# Patient Record
Sex: MALE | Race: BLACK OR AFRICAN AMERICAN | Employment: PART TIME | URBAN - METROPOLITAN AREA
[De-identification: names, ages, dates, MRNs, and addresses within clinical notes are randomized per-mention and may not be internally consistent; named-entity substitution may affect disease eponyms.]

---

## 2017-02-16 PROBLEM — R07.9 CHEST PAIN: Status: ACTIVE | Noted: 2017-02-16

## 2017-02-16 PROBLEM — R06.09 DOE (DYSPNEA ON EXERTION): Status: ACTIVE | Noted: 2017-02-16

## 2017-07-29 PROBLEM — R03.0 ELEVATED BLOOD PRESSURE READING: Status: ACTIVE | Noted: 2017-02-24

## 2017-11-02 PROBLEM — I10 ESSENTIAL HYPERTENSION: Status: ACTIVE | Noted: 2017-11-02

## 2017-11-30 PROBLEM — I10 ESSENTIAL HYPERTENSION: Status: RESOLVED | Noted: 2017-11-02 | Resolved: 2017-11-30

## 2017-11-30 PROBLEM — R06.09 DOE (DYSPNEA ON EXERTION): Status: RESOLVED | Noted: 2017-02-16 | Resolved: 2017-11-30

## 2017-11-30 PROBLEM — R03.0 ELEVATED BLOOD PRESSURE READING: Status: RESOLVED | Noted: 2017-02-24 | Resolved: 2017-11-30

## 2017-11-30 PROBLEM — R07.9 CHEST PAIN: Status: RESOLVED | Noted: 2017-02-16 | Resolved: 2017-11-30

## 2018-07-08 ENCOUNTER — APPOINTMENT (OUTPATIENT)
Dept: CT IMAGING | Age: 43
End: 2018-07-08
Payer: COMMERCIAL

## 2018-07-08 ENCOUNTER — HOSPITAL ENCOUNTER (EMERGENCY)
Age: 43
Discharge: HOME | End: 2018-07-08
Attending: EMERGENCY MEDICINE
Payer: COMMERCIAL

## 2018-07-08 ENCOUNTER — APPOINTMENT (OUTPATIENT)
Dept: GENERAL RADIOLOGY | Age: 43
End: 2018-07-08
Payer: COMMERCIAL

## 2018-07-08 VITALS
OXYGEN SATURATION: 99 % | HEIGHT: 68 IN | TEMPERATURE: 98 F | WEIGHT: 180 LBS | SYSTOLIC BLOOD PRESSURE: 142 MMHG | BODY MASS INDEX: 27.28 KG/M2 | DIASTOLIC BLOOD PRESSURE: 85 MMHG | RESPIRATION RATE: 16 BRPM | HEART RATE: 76 BPM

## 2018-07-08 DIAGNOSIS — R07.9 CHEST PAIN, UNSPECIFIED TYPE: Primary | ICD-10-CM

## 2018-07-08 DIAGNOSIS — K21.9 GASTRIC REFLUX: ICD-10-CM

## 2018-07-08 LAB
ALBUMIN SERPL-MCNC: 3.9 G/DL (ref 3.5–5.2)
ALP BLD-CCNC: 68 U/L (ref 40–129)
ALT SERPL-CCNC: 5 U/L (ref 0–40)
ANION GAP SERPL CALCULATED.3IONS-SCNC: 11 MMOL/L (ref 7–16)
AST SERPL-CCNC: 5 U/L (ref 0–39)
BASOPHILS ABSOLUTE: 0.04 E9/L (ref 0–0.2)
BASOPHILS RELATIVE PERCENT: 0.3 % (ref 0–2)
BILIRUB SERPL-MCNC: 0.3 MG/DL (ref 0–1.2)
BUN BLDV-MCNC: 17 MG/DL (ref 6–20)
CALCIUM SERPL-MCNC: 9.1 MG/DL (ref 8.6–10.2)
CHLORIDE BLD-SCNC: 100 MMOL/L (ref 98–107)
CO2: 28 MMOL/L (ref 22–29)
CREAT SERPL-MCNC: 1.2 MG/DL (ref 0.7–1.2)
EOSINOPHILS ABSOLUTE: 0.24 E9/L (ref 0.05–0.5)
EOSINOPHILS RELATIVE PERCENT: 1.7 % (ref 0–6)
GFR AFRICAN AMERICAN: >60
GFR NON-AFRICAN AMERICAN: >60 ML/MIN/1.73
GLUCOSE BLD-MCNC: 97 MG/DL (ref 74–109)
HCT VFR BLD CALC: 44.4 % (ref 37–54)
HEMOGLOBIN: 15.3 G/DL (ref 12.5–16.5)
IMMATURE GRANULOCYTES #: 0.03 E9/L
IMMATURE GRANULOCYTES %: 0.2 % (ref 0–5)
LIPASE: 256 U/L (ref 13–60)
LYMPHOCYTES ABSOLUTE: 4.96 E9/L (ref 1.5–4)
LYMPHOCYTES RELATIVE PERCENT: 35.9 % (ref 20–42)
MCH RBC QN AUTO: 31.1 PG (ref 26–35)
MCHC RBC AUTO-ENTMCNC: 34.5 % (ref 32–34.5)
MCV RBC AUTO: 90.2 FL (ref 80–99.9)
MONOCYTES ABSOLUTE: 0.84 E9/L (ref 0.1–0.95)
MONOCYTES RELATIVE PERCENT: 6.1 % (ref 2–12)
NEUTROPHILS ABSOLUTE: 7.7 E9/L (ref 1.8–7.3)
NEUTROPHILS RELATIVE PERCENT: 55.8 % (ref 43–80)
PDW BLD-RTO: 13.1 FL (ref 11.5–15)
PLATELET # BLD: 218 E9/L (ref 130–450)
PMV BLD AUTO: 12.3 FL (ref 7–12)
POTASSIUM SERPL-SCNC: 3.5 MMOL/L (ref 3.5–5)
RBC # BLD: 4.92 E12/L (ref 3.8–5.8)
SODIUM BLD-SCNC: 139 MMOL/L (ref 132–146)
TOTAL PROTEIN: 6.8 G/DL (ref 6.4–8.3)
TROPONIN: <0.01 NG/ML (ref 0–0.03)
WBC # BLD: 13.8 E9/L (ref 4.5–11.5)

## 2018-07-08 PROCEDURE — 84484 ASSAY OF TROPONIN QUANT: CPT

## 2018-07-08 PROCEDURE — 71045 X-RAY EXAM CHEST 1 VIEW: CPT

## 2018-07-08 PROCEDURE — 93005 ELECTROCARDIOGRAM TRACING: CPT | Performed by: EMERGENCY MEDICINE

## 2018-07-08 PROCEDURE — 74177 CT ABD & PELVIS W/CONTRAST: CPT

## 2018-07-08 PROCEDURE — 83690 ASSAY OF LIPASE: CPT

## 2018-07-08 PROCEDURE — 6360000004 HC RX CONTRAST MEDICATION: Performed by: RADIOLOGY

## 2018-07-08 PROCEDURE — 99285 EMERGENCY DEPT VISIT HI MDM: CPT

## 2018-07-08 PROCEDURE — 6370000000 HC RX 637 (ALT 250 FOR IP): Performed by: EMERGENCY MEDICINE

## 2018-07-08 PROCEDURE — 85025 COMPLETE CBC W/AUTO DIFF WBC: CPT

## 2018-07-08 PROCEDURE — 80053 COMPREHEN METABOLIC PANEL: CPT

## 2018-07-08 RX ORDER — FAMOTIDINE 20 MG/1
20 TABLET, FILM COATED ORAL ONCE
Status: COMPLETED | OUTPATIENT
Start: 2018-07-08 | End: 2018-07-08

## 2018-07-08 RX ORDER — FAMOTIDINE 20 MG/1
20 TABLET, FILM COATED ORAL 2 TIMES DAILY
Qty: 14 TABLET | Refills: 0 | Status: SHIPPED | OUTPATIENT
Start: 2018-07-08 | End: 2019-02-11

## 2018-07-08 RX ORDER — ASPIRIN 81 MG/1
324 TABLET, CHEWABLE ORAL ONCE
Status: COMPLETED | OUTPATIENT
Start: 2018-07-08 | End: 2018-07-08

## 2018-07-08 RX ORDER — NITROGLYCERIN 0.4 MG/1
0.4 TABLET SUBLINGUAL EVERY 5 MIN PRN
Status: DISCONTINUED | OUTPATIENT
Start: 2018-07-08 | End: 2018-07-08 | Stop reason: HOSPADM

## 2018-07-08 RX ADMIN — ASPIRIN 81 MG CHEWABLE TABLET 324 MG: 81 TABLET CHEWABLE at 08:15

## 2018-07-08 RX ADMIN — IOPAMIDOL 110 ML: 755 INJECTION, SOLUTION INTRAVENOUS at 09:34

## 2018-07-08 RX ADMIN — FAMOTIDINE 20 MG: 20 TABLET ORAL at 10:36

## 2018-07-08 RX ADMIN — NITROGLYCERIN 0.4 MG: 0.4 TABLET SUBLINGUAL at 08:15

## 2018-07-08 RX ADMIN — LIDOCAINE HYDROCHLORIDE: 20 SOLUTION ORAL; TOPICAL at 08:56

## 2018-07-08 ASSESSMENT — PAIN DESCRIPTION - DESCRIPTORS: DESCRIPTORS: ACHING

## 2018-07-08 ASSESSMENT — PAIN SCALES - GENERAL
PAINLEVEL_OUTOF10: 8
PAINLEVEL_OUTOF10: 6

## 2018-07-08 ASSESSMENT — PAIN DESCRIPTION - FREQUENCY: FREQUENCY: CONTINUOUS

## 2018-07-08 ASSESSMENT — PAIN DESCRIPTION - LOCATION
LOCATION: CHEST
LOCATION: ABDOMEN

## 2018-07-08 ASSESSMENT — PAIN SCALES - WONG BAKER: WONGBAKER_NUMERICALRESPONSE: 0

## 2018-07-08 ASSESSMENT — ENCOUNTER SYMPTOMS
NAUSEA: 1
VOMITING: 0
ABDOMINAL PAIN: 0
DIARRHEA: 0
COUGH: 0
SHORTNESS OF BREATH: 0
WHEEZING: 0

## 2018-07-08 ASSESSMENT — PAIN DESCRIPTION - PAIN TYPE
TYPE: ACUTE PAIN
TYPE: ACUTE PAIN

## 2018-07-08 ASSESSMENT — PAIN DESCRIPTION - ORIENTATION
ORIENTATION: UPPER
ORIENTATION: LEFT

## 2018-07-08 NOTE — ED PROVIDER NOTES
and well-nourished. No distress. HENT:   Head: Normocephalic and atraumatic. Right Ear: External ear normal.   Left Ear: External ear normal.   Eyes: Conjunctivae are normal.   Neck: Neck supple. Cardiovascular: Normal rate, regular rhythm, normal heart sounds and intact distal pulses. No murmur heard. Pulses:       Radial pulses are 2+ on the right side, and 2+ on the left side. Dorsalis pedis pulses are 2+ on the right side, and 2+ on the left side. Pulmonary/Chest: Effort normal and breath sounds normal. No respiratory distress. Abdominal: Soft. Bowel sounds are normal. He exhibits no distension. There is no tenderness. There is no rebound and no guarding. Musculoskeletal: He exhibits no edema (No lower extremity edema present). Neurological: He is alert and oriented to person, place, and time. Skin: Skin is warm and dry. He is not diaphoretic. Psychiatric: He has a normal mood and affect. Nursing note and vitals reviewed. Procedures    MDM  Number of Diagnoses or Management Options  Chest pain, unspecified type:   Gastric reflux:   Diagnosis management comments: Patient presents to the ED for chest pain. The patient was evaluated for acute coronary syndrome, pneumonia, pancreatitis, reflux. Patient was initially given aspirin and nitroglycerin with no relief of his symptoms. The RN stated the patient is complaining now more epigastric pain. Patient was given a GI cocktail with gradual improvement of the patient's symptoms. Patient was further evaluated the CT of abdomen/pelvis to further risk 8 elevated lipase. CT was unremarkable for acute pathology. Patient had a heart score of at most 2 as I believe this is not cardiac in origin. EKG showed benign early repolarization which is similar to his previous EKG. Stress test from last year also showed low risk for acute coronary event. Patient continues to be non-toxic on re-evaluation.  Findings were discussed with the patient and He reports that he does not drink alcohol or use drugs. Family History: family history includes Diabetes in his father; High Blood Pressure in his mother. The patients home medications have been reviewed. Allergies: Patient has no known allergies.     -------------------------------------------------- RESULTS -------------------------------------------------    Lab  Results for orders placed or performed during the hospital encounter of 07/08/18   CBC auto differential   Result Value Ref Range    WBC 13.8 (H) 4.5 - 11.5 E9/L    RBC 4.92 3.80 - 5.80 E12/L    Hemoglobin 15.3 12.5 - 16.5 g/dL    Hematocrit 44.4 37.0 - 54.0 %    MCV 90.2 80.0 - 99.9 fL    MCH 31.1 26.0 - 35.0 pg    MCHC 34.5 32.0 - 34.5 %    RDW 13.1 11.5 - 15.0 fL    Platelets 710 884 - 661 E9/L    MPV 12.3 (H) 7.0 - 12.0 fL    Neutrophils % 55.8 43.0 - 80.0 %    Immature Granulocytes % 0.2 0.0 - 5.0 %    Lymphocytes % 35.9 20.0 - 42.0 %    Monocytes % 6.1 2.0 - 12.0 %    Eosinophils % 1.7 0.0 - 6.0 %    Basophils % 0.3 0.0 - 2.0 %    Neutrophils # 7.70 (H) 1.80 - 7.30 E9/L    Immature Granulocytes # 0.03 E9/L    Lymphocytes # 4.96 (H) 1.50 - 4.00 E9/L    Monocytes # 0.84 0.10 - 0.95 E9/L    Eosinophils # 0.24 0.05 - 0.50 E9/L    Basophils # 0.04 0.00 - 0.20 E9/L   Comprehensive Metabolic Panel   Result Value Ref Range    Sodium 139 132 - 146 mmol/L    Potassium 3.5 3.5 - 5.0 mmol/L    Chloride 100 98 - 107 mmol/L    CO2 28 22 - 29 mmol/L    Anion Gap 11 7 - 16 mmol/L    Glucose 97 74 - 109 mg/dL    BUN 17 6 - 20 mg/dL    CREATININE 1.2 0.7 - 1.2 mg/dL    GFR Non-African American >60 >=60 mL/min/1.73    GFR African American >60     Calcium 9.1 8.6 - 10.2 mg/dL    Total Protein 6.8 6.4 - 8.3 g/dL    Alb 3.9 3.5 - 5.2 g/dL    Total Bilirubin 0.3 0.0 - 1.2 mg/dL    Alkaline Phosphatase 68 40 - 129 U/L    ALT 5 0 - 40 U/L    AST 5 0 - 39 U/L   Lipase   Result Value Ref Range    Lipase 256 (H) 13 - 60 U/L   Troponin   Result Value Ref Range Troponin <0.01 0.00 - 0.03 ng/mL       Radiology  CT ABDOMEN PELVIS W IV CONTRAST Additional Contrast? None   Final Result   Renal masses, statistically likely cysts. There are small parenchymal nonobstructive renal calculi                                      XR CHEST PORTABLE   Final Result   Tortuous ectatic aorta   Poor inspiratory effort, PA and lateral chest may be helpful when the   patient is able to cooperate                      EKG:  This EKG is signed and interpreted by me. Rate: 62  Rhythm: Sinus  Interpretation: Benign early repolarization  Comparison: stable as compared to patient's most recent EKG on 2/16/17    ------------------------- NURSING NOTES AND VITALS REVIEWED ---------------------------  Date / Time Roomed:  7/8/2018  7:13 AM  ED Bed Assignment:  23/23    The nursing notes within the ED encounter and vital signs as below have been reviewed. Patient Vitals for the past 24 hrs:   BP Temp Pulse Resp SpO2 Height Weight   07/08/18 1032 (!) 142/85 - 76 16 99 % - -   07/08/18 0828 133/81 - 64 16 99 % - -   07/08/18 0726 (!) 143/86 - 69 16 94 % - -   07/08/18 0717 (!) 148/90 98 °F (36.7 °C) 92 16 100 % 5' 8\" (1.727 m) 180 lb (81.6 kg)       Oxygen Saturation Interpretation: Normal    ------------------------------------------ PROGRESS NOTES ------------------------------------------  ED Course as of Jul 08 1111   Sun Jul 08, 2018   0919 Patient reassessed. Patient states pain has improved with GI cocktail. Patient states the nitroglycerin did not help one bit. Patient complains of epigastric pain now radiating to his left upper quadrant. With his elevated lipase I will order a CT abdomen further investigate. [MA]   1013 Patient reassessed. Patient does not appear to have acute surgical abdomen. There is no rigidity, guarding or rebound present. Patient states pain has almost resolved after GI cocktail. We discussed the results today.  He admits he \"hog out\" on food yesterday, especially before bedtime as this may exacerbate what we believe is reflux. [MA]      ED Course User Index  [MA] Kasia Gallego         10:15 AM  I have spoken with the patient and discussed todays results, in addition to providing specific details for the plan of care and counseling regarding the diagnosis and prognosis. Their questions are answered at this time and they are agreeable with the plan. I discussed at length with them reasons for immediate return here for re evaluation. They will followup with their primary care physician by calling their office on Monday.    --------------------------------- ADDITIONAL PROVIDER NOTES ---------------------------------  At this time the patient is without objective evidence of an acute process requiring hospitalization or inpatient management. They have remained hemodynamically stable throughout their entire ED visit and are stable for discharge with outpatient follow-up. The plan has been discussed in detail and they are aware of the specific conditions for emergent return, as well as the importance of follow-up. Discharge Medication List as of 7/8/2018 10:22 AM      START taking these medications    Details   famotidine (PEPCID) 20 MG tablet Take 1 tablet by mouth 2 times daily for 7 days, Disp-14 tablet, R-0Print             Diagnosis:  1. Chest pain, unspecified type    2. Gastric reflux        Disposition:  Patient's disposition: Discharge to home  Patient's condition is stable.                  Kasia Gallego  Resident  07/08/18 8458

## 2018-07-08 NOTE — ED NOTES
No relief from the nitro SL, complains of abd pain mid epigastric.       Hugo Mariscal RN  07/08/18 1078

## 2018-07-09 LAB
EKG ATRIAL RATE: 62 BPM
EKG P AXIS: 60 DEGREES
EKG P-R INTERVAL: 188 MS
EKG Q-T INTERVAL: 392 MS
EKG QRS DURATION: 98 MS
EKG QTC CALCULATION (BAZETT): 397 MS
EKG R AXIS: 39 DEGREES
EKG T AXIS: 14 DEGREES
EKG VENTRICULAR RATE: 62 BPM

## 2019-02-07 ENCOUNTER — HOSPITAL ENCOUNTER (EMERGENCY)
Age: 44
Discharge: HOME | End: 2019-02-07
Payer: COMMERCIAL

## 2019-02-07 ENCOUNTER — APPOINTMENT (OUTPATIENT)
Dept: GENERAL RADIOLOGY | Age: 44
End: 2019-02-07
Payer: COMMERCIAL

## 2019-02-07 VITALS
HEART RATE: 90 BPM | DIASTOLIC BLOOD PRESSURE: 97 MMHG | OXYGEN SATURATION: 98 % | HEIGHT: 69 IN | BODY MASS INDEX: 26.96 KG/M2 | SYSTOLIC BLOOD PRESSURE: 146 MMHG | RESPIRATION RATE: 14 BRPM | TEMPERATURE: 98.8 F | WEIGHT: 182 LBS

## 2019-02-07 DIAGNOSIS — M76.60 ACHILLES TENDINITIS, UNSPECIFIED LATERALITY: Primary | ICD-10-CM

## 2019-02-07 PROCEDURE — 99283 EMERGENCY DEPT VISIT LOW MDM: CPT

## 2019-02-07 PROCEDURE — 29515 APPLICATION SHORT LEG SPLINT: CPT

## 2019-02-07 PROCEDURE — 73610 X-RAY EXAM OF ANKLE: CPT

## 2019-02-07 PROCEDURE — 6370000000 HC RX 637 (ALT 250 FOR IP): Performed by: PHYSICIAN ASSISTANT

## 2019-02-07 RX ORDER — HYDROCODONE BITARTRATE AND ACETAMINOPHEN 5; 325 MG/1; MG/1
1 TABLET ORAL ONCE
Status: COMPLETED | OUTPATIENT
Start: 2019-02-07 | End: 2019-02-07

## 2019-02-07 RX ORDER — NAPROXEN 500 MG/1
500 TABLET ORAL 2 TIMES DAILY
Qty: 14 TABLET | Refills: 0 | Status: SHIPPED | OUTPATIENT
Start: 2019-02-07 | End: 2019-07-11

## 2019-02-07 RX ADMIN — HYDROCODONE BITARTRATE AND ACETAMINOPHEN 1 TABLET: 5; 325 TABLET ORAL at 21:49

## 2019-02-07 ASSESSMENT — PAIN SCALES - GENERAL
PAINLEVEL_OUTOF10: 8
PAINLEVEL_OUTOF10: 8

## 2019-02-08 ENCOUNTER — TELEPHONE (OUTPATIENT)
Dept: SURGERY | Age: 44
End: 2019-02-08

## 2019-02-11 ENCOUNTER — APPOINTMENT (OUTPATIENT)
Dept: ULTRASOUND IMAGING | Age: 44
End: 2019-02-11
Payer: COMMERCIAL

## 2019-02-11 ENCOUNTER — TELEPHONE (OUTPATIENT)
Dept: ADMINISTRATIVE | Age: 44
End: 2019-02-11

## 2019-02-11 ENCOUNTER — HOSPITAL ENCOUNTER (EMERGENCY)
Age: 44
Discharge: HOME | End: 2019-02-11
Payer: COMMERCIAL

## 2019-02-11 VITALS
OXYGEN SATURATION: 98 % | HEART RATE: 75 BPM | TEMPERATURE: 98.5 F | SYSTOLIC BLOOD PRESSURE: 135 MMHG | DIASTOLIC BLOOD PRESSURE: 82 MMHG | BODY MASS INDEX: 26.96 KG/M2 | RESPIRATION RATE: 16 BRPM | HEIGHT: 69 IN | WEIGHT: 182 LBS

## 2019-02-11 DIAGNOSIS — S86.012D STRAIN OF LEFT ACHILLES TENDON, SUBSEQUENT ENCOUNTER: Primary | ICD-10-CM

## 2019-02-11 PROCEDURE — 6370000000 HC RX 637 (ALT 250 FOR IP): Performed by: PHYSICIAN ASSISTANT

## 2019-02-11 PROCEDURE — 29515 APPLICATION SHORT LEG SPLINT: CPT

## 2019-02-11 PROCEDURE — 93971 EXTREMITY STUDY: CPT

## 2019-02-11 PROCEDURE — 99283 EMERGENCY DEPT VISIT LOW MDM: CPT

## 2019-02-11 RX ORDER — IBUPROFEN 400 MG/1
600 TABLET ORAL ONCE
Status: COMPLETED | OUTPATIENT
Start: 2019-02-11 | End: 2019-02-11

## 2019-02-11 RX ADMIN — IBUPROFEN 600 MG: 400 TABLET, FILM COATED ORAL at 08:55

## 2019-02-11 ASSESSMENT — PAIN SCALES - GENERAL: PAINLEVEL_OUTOF10: 6

## 2019-02-11 ASSESSMENT — PAIN DESCRIPTION - PAIN TYPE: TYPE: ACUTE PAIN

## 2019-02-11 ASSESSMENT — PAIN DESCRIPTION - PROGRESSION: CLINICAL_PROGRESSION: GRADUALLY WORSENING

## 2019-02-11 ASSESSMENT — PAIN DESCRIPTION - ORIENTATION: ORIENTATION: LEFT

## 2019-02-11 ASSESSMENT — PAIN DESCRIPTION - LOCATION: LOCATION: LEG;FOOT

## 2019-02-11 ASSESSMENT — PAIN DESCRIPTION - FREQUENCY: FREQUENCY: CONTINUOUS

## 2019-02-11 ASSESSMENT — PAIN DESCRIPTION - DESCRIPTORS: DESCRIPTORS: ACHING

## 2019-02-14 ENCOUNTER — OFFICE VISIT (OUTPATIENT)
Dept: ORTHOPEDIC SURGERY | Age: 44
End: 2019-02-14
Payer: COMMERCIAL

## 2019-02-14 VITALS
WEIGHT: 185 LBS | BODY MASS INDEX: 27.4 KG/M2 | DIASTOLIC BLOOD PRESSURE: 79 MMHG | HEIGHT: 69 IN | TEMPERATURE: 99 F | HEART RATE: 74 BPM | SYSTOLIC BLOOD PRESSURE: 131 MMHG

## 2019-02-14 DIAGNOSIS — S86.012A RUPTURE OF LEFT ACHILLES TENDON, INITIAL ENCOUNTER: Primary | ICD-10-CM

## 2019-02-14 PROCEDURE — 99213 OFFICE O/P EST LOW 20 MIN: CPT | Performed by: ORTHOPAEDIC SURGERY

## 2019-02-14 PROCEDURE — 99204 OFFICE O/P NEW MOD 45 MIN: CPT | Performed by: ORTHOPAEDIC SURGERY

## 2019-02-18 ENCOUNTER — PREP FOR PROCEDURE (OUTPATIENT)
Dept: ORTHOPEDIC SURGERY | Age: 44
End: 2019-02-18

## 2019-02-18 RX ORDER — SODIUM CHLORIDE, SODIUM LACTATE, POTASSIUM CHLORIDE, CALCIUM CHLORIDE 600; 310; 30; 20 MG/100ML; MG/100ML; MG/100ML; MG/100ML
INJECTION, SOLUTION INTRAVENOUS CONTINUOUS
Status: CANCELLED | OUTPATIENT
Start: 2019-02-18

## 2019-02-18 RX ORDER — CEFAZOLIN SODIUM 2 G/50ML
2 SOLUTION INTRAVENOUS
Status: CANCELLED | OUTPATIENT
Start: 2019-02-18 | End: 2019-02-18

## 2019-02-18 RX ORDER — SODIUM CHLORIDE 0.9 % (FLUSH) 0.9 %
10 SYRINGE (ML) INJECTION EVERY 12 HOURS SCHEDULED
Status: CANCELLED | OUTPATIENT
Start: 2019-02-18

## 2019-02-18 RX ORDER — SODIUM CHLORIDE 0.9 % (FLUSH) 0.9 %
10 SYRINGE (ML) INJECTION PRN
Status: CANCELLED | OUTPATIENT
Start: 2019-02-18

## 2019-02-18 NOTE — PROGRESS NOTES
[facility] 35 PRE-ADMISSION TESTING GENERAL INSTRUCTIONS- [facility]-phone number:[phone]    GENERAL INSTRUCTIONS  [x] Antibacterial Soap shower Night before and/or AM of Surgery  [] Moose wipe instruction sheet and wipes given. [x] Nothing by mouth after midnight, including gum, candy, mints, or water. [x] You may brush your teeth, gargle, but do NOT swallow water. []Hibiclens shower  the night before and the morning of surgery. Do not use             Hibiclens on your face or head. []No smoking, chewing tobacco, illegal drugs, or alcohol within 24 hours of your surgery. [x] Jewelry, valuables or body piercing's should not be brought to the hospital. All body and/or tongue piercing's must be removed prior to arriving to hospital.  ALL hair pins must be removed. [x] Do not wear makeup, lotions, powders, deodorant. Nail polish as directed by the nurse. [x] Arrange transportation with a responsible adult  to and from the hospital. If you do not have a responsible adult  to transport you, you will need to make arrangements with a medical transportation company (i.e. [employer]. A Uber/taxi/bus is not appropriate unless you are accompanied by a responsible adult ). Arrange for someone to be with you for the remainder of the day and for 24 hours after your procedure due to having had anesthesia. Who will be your  for transportation?____GIRLFRIEND ______________   Who will be staying with you for 24 hrs after your procedure?_______GIRLFRIEND___________  [x] Bring insurance card and photo ID.  [] Transfusion Bracelet: Please bring with you to hospital, day of surgery  [] Bring urine specimen day of surgery. Any small container is acceptable. [] Use inhalers the morning of surgery and bring with you to hospital.   []Bring copy of living will or healthcare power of  papers to be placed in your electronic record.   [] CPAP/BI-PAP: Please bring your

## 2019-02-20 ENCOUNTER — ANESTHESIA (OUTPATIENT)
Dept: OPERATING ROOM | Age: 44
End: 2019-02-20
Payer: COMMERCIAL

## 2019-02-20 ENCOUNTER — ANESTHESIA EVENT (OUTPATIENT)
Dept: OPERATING ROOM | Age: 44
End: 2019-02-20
Payer: COMMERCIAL

## 2019-02-20 ENCOUNTER — TELEPHONE (OUTPATIENT)
Dept: ORTHOPEDIC SURGERY | Age: 44
End: 2019-02-20

## 2019-02-20 ENCOUNTER — HOSPITAL ENCOUNTER (OUTPATIENT)
Age: 44
Setting detail: OUTPATIENT SURGERY
Discharge: HOME | End: 2019-02-20
Attending: ORTHOPAEDIC SURGERY | Admitting: ORTHOPAEDIC SURGERY
Payer: COMMERCIAL

## 2019-02-20 VITALS
DIASTOLIC BLOOD PRESSURE: 82 MMHG | BODY MASS INDEX: 27.4 KG/M2 | WEIGHT: 185 LBS | HEART RATE: 70 BPM | OXYGEN SATURATION: 99 % | HEIGHT: 69 IN | TEMPERATURE: 97.2 F | RESPIRATION RATE: 17 BRPM | SYSTOLIC BLOOD PRESSURE: 132 MMHG

## 2019-02-20 VITALS — TEMPERATURE: 96.1 F | OXYGEN SATURATION: 100 % | DIASTOLIC BLOOD PRESSURE: 75 MMHG | SYSTOLIC BLOOD PRESSURE: 122 MMHG

## 2019-02-20 DIAGNOSIS — S86.012A RUPTURE OF LEFT ACHILLES TENDON, INITIAL ENCOUNTER: ICD-10-CM

## 2019-02-20 DIAGNOSIS — I10 ESSENTIAL HYPERTENSION: ICD-10-CM

## 2019-02-20 DIAGNOSIS — Z01.818 PRE-OP TESTING: Primary | ICD-10-CM

## 2019-02-20 PROCEDURE — 7100000011 HC PHASE II RECOVERY - ADDTL 15 MIN: Performed by: ORTHOPAEDIC SURGERY

## 2019-02-20 PROCEDURE — 6370000000 HC RX 637 (ALT 250 FOR IP): Performed by: STUDENT IN AN ORGANIZED HEALTH CARE EDUCATION/TRAINING PROGRAM

## 2019-02-20 PROCEDURE — 6360000002 HC RX W HCPCS: Performed by: ANESTHESIOLOGY

## 2019-02-20 PROCEDURE — 7100000010 HC PHASE II RECOVERY - FIRST 15 MIN: Performed by: ORTHOPAEDIC SURGERY

## 2019-02-20 PROCEDURE — 2709999900 HC NON-CHARGEABLE SUPPLY: Performed by: ORTHOPAEDIC SURGERY

## 2019-02-20 PROCEDURE — 7100000001 HC PACU RECOVERY - ADDTL 15 MIN: Performed by: ORTHOPAEDIC SURGERY

## 2019-02-20 PROCEDURE — 27650 REPAIR ACHILLES TENDON: CPT | Performed by: ORTHOPAEDIC SURGERY

## 2019-02-20 PROCEDURE — 3700000001 HC ADD 15 MINUTES (ANESTHESIA): Performed by: ORTHOPAEDIC SURGERY

## 2019-02-20 PROCEDURE — 3700000000 HC ANESTHESIA ATTENDED CARE: Performed by: ORTHOPAEDIC SURGERY

## 2019-02-20 PROCEDURE — 2580000003 HC RX 258: Performed by: PHYSICIAN ASSISTANT

## 2019-02-20 PROCEDURE — 7100000000 HC PACU RECOVERY - FIRST 15 MIN: Performed by: ORTHOPAEDIC SURGERY

## 2019-02-20 PROCEDURE — 2500000003 HC RX 250 WO HCPCS

## 2019-02-20 PROCEDURE — 6360000002 HC RX W HCPCS: Performed by: PHYSICIAN ASSISTANT

## 2019-02-20 PROCEDURE — 3600000003 HC SURGERY LEVEL 3 BASE: Performed by: ORTHOPAEDIC SURGERY

## 2019-02-20 PROCEDURE — 6360000002 HC RX W HCPCS

## 2019-02-20 PROCEDURE — 3600000013 HC SURGERY LEVEL 3 ADDTL 15MIN: Performed by: ORTHOPAEDIC SURGERY

## 2019-02-20 PROCEDURE — 64447 NJX AA&/STRD FEMORAL NRV IMG: CPT | Performed by: ANESTHESIOLOGY

## 2019-02-20 PROCEDURE — 6370000000 HC RX 637 (ALT 250 FOR IP): Performed by: ORTHOPAEDIC SURGERY

## 2019-02-20 RX ORDER — MORPHINE SULFATE 2 MG/ML
1 INJECTION, SOLUTION INTRAMUSCULAR; INTRAVENOUS EVERY 5 MIN PRN
Status: DISCONTINUED | OUTPATIENT
Start: 2019-02-20 | End: 2019-02-20 | Stop reason: HOSPADM

## 2019-02-20 RX ORDER — ROCURONIUM BROMIDE 10 MG/ML
INJECTION, SOLUTION INTRAVENOUS PRN
Status: DISCONTINUED | OUTPATIENT
Start: 2019-02-20 | End: 2019-02-20 | Stop reason: SDUPTHER

## 2019-02-20 RX ORDER — HYDROCODONE BITARTRATE AND ACETAMINOPHEN 5; 325 MG/1; MG/1
1 TABLET ORAL PRN
Status: DISCONTINUED | OUTPATIENT
Start: 2019-02-20 | End: 2019-02-20 | Stop reason: HOSPADM

## 2019-02-20 RX ORDER — HYDROCODONE BITARTRATE AND ACETAMINOPHEN 5; 325 MG/1; MG/1
2 TABLET ORAL PRN
Status: DISCONTINUED | OUTPATIENT
Start: 2019-02-20 | End: 2019-02-20 | Stop reason: HOSPADM

## 2019-02-20 RX ORDER — OXYCODONE HYDROCHLORIDE AND ACETAMINOPHEN 5; 325 MG/1; MG/1
1 TABLET ORAL ONCE
Status: COMPLETED | OUTPATIENT
Start: 2019-02-20 | End: 2019-02-20

## 2019-02-20 RX ORDER — OXYCODONE HYDROCHLORIDE AND ACETAMINOPHEN 5; 325 MG/1; MG/1
1 TABLET ORAL EVERY 6 HOURS PRN
Qty: 28 TABLET | Refills: 0 | Status: SHIPPED | OUTPATIENT
Start: 2019-02-20 | End: 2019-02-27

## 2019-02-20 RX ORDER — DIAPER,BRIEF,INFANT-TODD,DISP
EACH MISCELLANEOUS PRN
Status: DISCONTINUED | OUTPATIENT
Start: 2019-02-20 | End: 2019-02-20 | Stop reason: ALTCHOICE

## 2019-02-20 RX ORDER — CEFAZOLIN SODIUM 2 G/50ML
2 SOLUTION INTRAVENOUS
Status: COMPLETED | OUTPATIENT
Start: 2019-02-20 | End: 2019-02-20

## 2019-02-20 RX ORDER — MIDAZOLAM HYDROCHLORIDE 1 MG/ML
2 INJECTION INTRAMUSCULAR; INTRAVENOUS ONCE
Status: COMPLETED | OUTPATIENT
Start: 2019-02-20 | End: 2019-02-20

## 2019-02-20 RX ORDER — ROPIVACAINE HYDROCHLORIDE 5 MG/ML
30 INJECTION, SOLUTION EPIDURAL; INFILTRATION; PERINEURAL ONCE
Status: COMPLETED | OUTPATIENT
Start: 2019-02-20 | End: 2019-02-20

## 2019-02-20 RX ORDER — FENTANYL CITRATE 50 UG/ML
100 INJECTION, SOLUTION INTRAMUSCULAR; INTRAVENOUS ONCE
Status: COMPLETED | OUTPATIENT
Start: 2019-02-20 | End: 2019-02-20

## 2019-02-20 RX ORDER — MORPHINE SULFATE 2 MG/ML
2 INJECTION, SOLUTION INTRAMUSCULAR; INTRAVENOUS EVERY 5 MIN PRN
Status: DISCONTINUED | OUTPATIENT
Start: 2019-02-20 | End: 2019-02-20 | Stop reason: HOSPADM

## 2019-02-20 RX ORDER — ASPIRIN 325 MG
325 TABLET, DELAYED RELEASE (ENTERIC COATED) ORAL 2 TIMES DAILY
Qty: 56 TABLET | Refills: 0 | Status: SHIPPED | OUTPATIENT
Start: 2019-02-20 | End: 2019-03-08 | Stop reason: SDUPTHER

## 2019-02-20 RX ORDER — GLYCOPYRROLATE 1 MG/5 ML
SYRINGE (ML) INTRAVENOUS PRN
Status: DISCONTINUED | OUTPATIENT
Start: 2019-02-20 | End: 2019-02-20 | Stop reason: SDUPTHER

## 2019-02-20 RX ORDER — ONDANSETRON 2 MG/ML
INJECTION INTRAMUSCULAR; INTRAVENOUS PRN
Status: DISCONTINUED | OUTPATIENT
Start: 2019-02-20 | End: 2019-02-20 | Stop reason: SDUPTHER

## 2019-02-20 RX ORDER — SODIUM CHLORIDE, SODIUM LACTATE, POTASSIUM CHLORIDE, CALCIUM CHLORIDE 600; 310; 30; 20 MG/100ML; MG/100ML; MG/100ML; MG/100ML
INJECTION, SOLUTION INTRAVENOUS CONTINUOUS
Status: DISCONTINUED | OUTPATIENT
Start: 2019-02-20 | End: 2019-02-20 | Stop reason: HOSPADM

## 2019-02-20 RX ORDER — SODIUM CHLORIDE 0.9 % (FLUSH) 0.9 %
10 SYRINGE (ML) INJECTION EVERY 12 HOURS SCHEDULED
Status: DISCONTINUED | OUTPATIENT
Start: 2019-02-20 | End: 2019-02-20 | Stop reason: HOSPADM

## 2019-02-20 RX ORDER — PROMETHAZINE HYDROCHLORIDE 25 MG/ML
6.25 INJECTION, SOLUTION INTRAMUSCULAR; INTRAVENOUS EVERY 10 MIN PRN
Status: DISCONTINUED | OUTPATIENT
Start: 2019-02-20 | End: 2019-02-20 | Stop reason: HOSPADM

## 2019-02-20 RX ORDER — NEOSTIGMINE METHYLSULFATE 0.5 MG/ML
INJECTION, SOLUTION INTRAVENOUS PRN
Status: DISCONTINUED | OUTPATIENT
Start: 2019-02-20 | End: 2019-02-20 | Stop reason: SDUPTHER

## 2019-02-20 RX ORDER — DEXAMETHASONE SODIUM PHOSPHATE 10 MG/ML
INJECTION INTRAMUSCULAR; INTRAVENOUS PRN
Status: DISCONTINUED | OUTPATIENT
Start: 2019-02-20 | End: 2019-02-20 | Stop reason: SDUPTHER

## 2019-02-20 RX ORDER — PROPOFOL 10 MG/ML
INJECTION, EMULSION INTRAVENOUS PRN
Status: DISCONTINUED | OUTPATIENT
Start: 2019-02-20 | End: 2019-02-20 | Stop reason: SDUPTHER

## 2019-02-20 RX ORDER — SODIUM CHLORIDE 0.9 % (FLUSH) 0.9 %
10 SYRINGE (ML) INJECTION PRN
Status: DISCONTINUED | OUTPATIENT
Start: 2019-02-20 | End: 2019-02-20 | Stop reason: HOSPADM

## 2019-02-20 RX ORDER — MEPERIDINE HYDROCHLORIDE 50 MG/ML
12.5 INJECTION INTRAMUSCULAR; INTRAVENOUS; SUBCUTANEOUS EVERY 5 MIN PRN
Status: DISCONTINUED | OUTPATIENT
Start: 2019-02-20 | End: 2019-02-20 | Stop reason: HOSPADM

## 2019-02-20 RX ADMIN — SODIUM CHLORIDE, POTASSIUM CHLORIDE, SODIUM LACTATE AND CALCIUM CHLORIDE: 600; 310; 30; 20 INJECTION, SOLUTION INTRAVENOUS at 11:21

## 2019-02-20 RX ADMIN — FENTANYL CITRATE 100 MCG: 50 INJECTION, SOLUTION INTRAMUSCULAR; INTRAVENOUS at 13:00

## 2019-02-20 RX ADMIN — PROPOFOL 200 MG: 10 INJECTION, EMULSION INTRAVENOUS at 13:46

## 2019-02-20 RX ADMIN — MIDAZOLAM HYDROCHLORIDE 2 MG: 2 INJECTION, SOLUTION INTRAMUSCULAR; INTRAVENOUS at 13:00

## 2019-02-20 RX ADMIN — ONDANSETRON 4 MG: 2 INJECTION INTRAMUSCULAR; INTRAVENOUS at 14:53

## 2019-02-20 RX ADMIN — ROCURONIUM BROMIDE 40 MG: 10 INJECTION, SOLUTION INTRAVENOUS at 13:46

## 2019-02-20 RX ADMIN — SODIUM CHLORIDE, POTASSIUM CHLORIDE, SODIUM LACTATE AND CALCIUM CHLORIDE: 600; 310; 30; 20 INJECTION, SOLUTION INTRAVENOUS at 14:40

## 2019-02-20 RX ADMIN — MORPHINE SULFATE 2 MG: 2 INJECTION, SOLUTION INTRAMUSCULAR; INTRAVENOUS at 15:40

## 2019-02-20 RX ADMIN — CEFAZOLIN SODIUM 2 G: 2 SOLUTION INTRAVENOUS at 13:50

## 2019-02-20 RX ADMIN — OXYCODONE AND ACETAMINOPHEN 1 TABLET: 5; 325 TABLET ORAL at 16:23

## 2019-02-20 RX ADMIN — NEOSTIGMINE METHYLSULFATE 3 MG: 0.5 INJECTION INTRAVENOUS at 15:10

## 2019-02-20 RX ADMIN — ROPIVACAINE HYDROCHLORIDE 30 ML: 5 INJECTION EPIDURAL; INFILTRATION; PERINEURAL at 13:00

## 2019-02-20 RX ADMIN — MORPHINE SULFATE 2 MG: 2 INJECTION, SOLUTION INTRAMUSCULAR; INTRAVENOUS at 16:00

## 2019-02-20 RX ADMIN — LIDOCAINE HYDROCHLORIDE 100 MG: 20 INJECTION, SOLUTION INTRAVENOUS at 13:46

## 2019-02-20 RX ADMIN — Medication 0.6 MG: at 15:10

## 2019-02-20 RX ADMIN — DEXAMETHASONE SODIUM PHOSPHATE 10 MG: 10 INJECTION INTRAMUSCULAR; INTRAVENOUS at 13:46

## 2019-02-20 ASSESSMENT — PULMONARY FUNCTION TESTS
PIF_VALUE: 30
PIF_VALUE: 26
PIF_VALUE: 17
PIF_VALUE: 18
PIF_VALUE: 29
PIF_VALUE: 21
PIF_VALUE: 8
PIF_VALUE: 1
PIF_VALUE: 1
PIF_VALUE: 29
PIF_VALUE: 27
PIF_VALUE: 20
PIF_VALUE: 27
PIF_VALUE: 29
PIF_VALUE: 29
PIF_VALUE: 18
PIF_VALUE: 28
PIF_VALUE: 27
PIF_VALUE: 22
PIF_VALUE: 4
PIF_VALUE: 27
PIF_VALUE: 27
PIF_VALUE: 21
PIF_VALUE: 28
PIF_VALUE: 28
PIF_VALUE: 21
PIF_VALUE: 21
PIF_VALUE: 29
PIF_VALUE: 1
PIF_VALUE: 27
PIF_VALUE: 27
PIF_VALUE: 31
PIF_VALUE: 27
PIF_VALUE: 27
PIF_VALUE: 28
PIF_VALUE: 29
PIF_VALUE: 3
PIF_VALUE: 28
PIF_VALUE: 28
PIF_VALUE: 30
PIF_VALUE: 27
PIF_VALUE: 28
PIF_VALUE: 27
PIF_VALUE: 0
PIF_VALUE: 21
PIF_VALUE: 27
PIF_VALUE: 26
PIF_VALUE: 2
PIF_VALUE: 27
PIF_VALUE: 26
PIF_VALUE: 4
PIF_VALUE: 27
PIF_VALUE: 29
PIF_VALUE: 27
PIF_VALUE: 28
PIF_VALUE: 28
PIF_VALUE: 29
PIF_VALUE: 28
PIF_VALUE: 21
PIF_VALUE: 30
PIF_VALUE: 8
PIF_VALUE: 29
PIF_VALUE: 20
PIF_VALUE: 29
PIF_VALUE: 31
PIF_VALUE: 1
PIF_VALUE: 2
PIF_VALUE: 27
PIF_VALUE: 27
PIF_VALUE: 28
PIF_VALUE: 28
PIF_VALUE: 27
PIF_VALUE: 27
PIF_VALUE: 7
PIF_VALUE: 19
PIF_VALUE: 30
PIF_VALUE: 28
PIF_VALUE: 28
PIF_VALUE: 27
PIF_VALUE: 0
PIF_VALUE: 25
PIF_VALUE: 30
PIF_VALUE: 16
PIF_VALUE: 27
PIF_VALUE: 29
PIF_VALUE: 29
PIF_VALUE: 0
PIF_VALUE: 28
PIF_VALUE: 19
PIF_VALUE: 27
PIF_VALUE: 30
PIF_VALUE: 27
PIF_VALUE: 27

## 2019-02-20 ASSESSMENT — PAIN DESCRIPTION - LOCATION
LOCATION: LEG

## 2019-02-20 ASSESSMENT — PAIN SCALES - GENERAL
PAINLEVEL_OUTOF10: 10
PAINLEVEL_OUTOF10: 4
PAINLEVEL_OUTOF10: 8
PAINLEVEL_OUTOF10: 10
PAINLEVEL_OUTOF10: 0

## 2019-02-20 ASSESSMENT — PAIN DESCRIPTION - PAIN TYPE
TYPE: SURGICAL PAIN

## 2019-02-20 ASSESSMENT — PAIN DESCRIPTION - DESCRIPTORS
DESCRIPTORS: ACHING;BURNING;CONSTANT
DESCRIPTORS: ACHING
DESCRIPTORS: SORE;ACHING
DESCRIPTORS: ACHING

## 2019-02-20 ASSESSMENT — PAIN DESCRIPTION - ORIENTATION
ORIENTATION: LEFT;LOWER

## 2019-02-20 ASSESSMENT — PAIN DESCRIPTION - FREQUENCY
FREQUENCY: CONTINUOUS

## 2019-02-20 ASSESSMENT — PAIN DESCRIPTION - PROGRESSION
CLINICAL_PROGRESSION: GRADUALLY IMPROVING
CLINICAL_PROGRESSION: GRADUALLY WORSENING
CLINICAL_PROGRESSION: GRADUALLY WORSENING

## 2019-02-20 ASSESSMENT — PAIN - FUNCTIONAL ASSESSMENT: PAIN_FUNCTIONAL_ASSESSMENT: 0-10

## 2019-02-20 NOTE — BRIEF OP NOTE
Brief Postoperative Note  ______________________________________________________________    Patient: Kourtney Cheatham  YOB: 1975  MRN: [id number]  Date of Procedure: 2/20/2019    Pre-Op Diagnosis: LEFT ACHILLES TENDON RUPTURE    Post-Op Diagnosis: Same       Procedure(s):  LEFT ACHILLES TENDON REPAIR    Anesthesia: Regional, General    Surgeon(s):  Mortimer Ginsberg, DO    Assistant: Vanesa Bowman DO    Estimated Blood Loss (mL): less than 50     Complications: None    Specimens:   * No specimens in log *    Implants:  * No implants in log *        Jose Eduardo Lancaster DO  Date: 2/20/2019  Time: 2:45 PM     Electronically Signed By  Dennys IRAHETA

## 2019-02-20 NOTE — H&P
Please refer to my H&P below. No interval changes. Electronically Signed By  Jeff Gustafson D.O.  2/20/2019  11:53 AM        Orthopaedic Surgery H&P Note     Walter Gabriel is a 39 y.o. male, his YOB: 1975 with the [facility] [place]             Patient Active Problem List     Diagnosis Date Noted    Essential hypertension 11/02/2017      Current Facility-Administered Medications          Current Outpatient Prescriptions   Medication Sig Dispense Refill    naproxen (NAPROSYN) 500 MG tablet Take 1 tablet by mouth 2 times daily for 7 days 14 tablet 0      No current facility-administered medications for this visit.          Allergies: Patient has no known allergies. Past Medical History        Past Medical History:   Diagnosis Date    Closed fracture of left distal radius 7/2/2014    Essential hypertension 11/2/2017    Fracture of radial neck, right, closed 7/2/2014         Past Surgical History   No past surgical history on file. Family History         Family History   Problem Relation Age of Onset    High Blood Pressure Mother      Diabetes Father                Social History   Substance Use Topics    Smoking status: Never Smoker    Smokeless tobacco: Never Used    Alcohol use No                                    Chief Complaint   Patient presents with    ED Follow-up       seen 02/07/19 playing basketball and became unable to weight bear on lt foot / seen again on 02/11/19 for severe pain.  Tendonitis         SUBJECTIVE:   This is a 59-year-old male presents the office for follow-up following an emergency department visit on 2/7/2019. Patient states he was playing basketball felt a severe pain in his left heel, following the injury he was unable to weight-bear. He reported the emergency department on 2/7/19 as well as 2/11/19 regarding the same pain.  Continues to complain of pain today about the base of the heel in the region of the achilles station, CN II-VII intact  HEENT: normochephalic atraumatic, external ears and eyes normal, sclera normal, neck supple, no nasal discharge. Extremities:   peripheral pulses normal, no edema, redness or tenderness in the calves   Skin: normal coloration, no rashes or open wounds, no suspicious skin lesions noted  Psych: Affect euthymic   Musculoskeletal:   Left Lower Extremity Exam:  Skin intact , no signs of infection, or breakdown. Swelling and ecchymosis over Achilles distally, palpable defect, + [place]  Tender to palpation over tendon watershed area   Demonstrates active ankle dorsiflexion/great toe extension. Weak active planter flexion noted. Sensation intact to light touch in sural/deep peroneal/superficial peroneal/saphenous/posterior tibial nerve distributions to foot/ankle. Palpable dorsalis pedis and posterior tibialis pulses, cap refill brisk in toes, foot warm/perfused.     /79 (Site: Left Upper Arm, Position: Sitting, Cuff Size: Medium Adult)   Pulse 74   Temp 99 °F (37.2 °C)   Ht 5' 9\" (1.753 m)   Wt 185 lb (83.9 kg)   BMI 27.32 kg/m²      XR: 2/18/19   Xrays of the left ankle reviewed demonstrating no acute fracture, pathology, or bony deformity. Ultrasound left leg also obtained demonstrating no DVT. ASSESSMENT:       Diagnosis Orders   1. Rupture of left Achilles tendon, initial encounter            PLAN:  Had lengthy discussion with patient regarding their diagnosis, typical prognosis, and expected outcomes. We reviewed the possible complications from the injury itself despite treatment chosen. We also discussed treatment options including nonoperative managements versus surgical management, along with risks and benefits of each.    Patient has elected for surgical management despite associated risks.      Plan for OR on 2/20/19 for left achilles tendon repair  Placed in splint, keep clean and dry  Remain NWB     I have explained the risks and complications of the recommended surgery with the patient at length, as well as discussed potential treatment alternatives including nonoperative management.  These risks include but are not limited to death or complication from anesthesia, continued pain, nerve tendon or vascular injury, infection, rerupture, stiffness, weakness, wound healing complications, symptomatic hardware or hardware failure, deep vein thrombosis or pulmonary embolism, and need for further surgery, etc.  Patient understood this, asked appropriate questions, which were all answered, and he has elected to proceed with the procedure.     Electronically Signed By  Colette Cheadle D.O.  2/14/2019  2:33 PM

## 2019-02-21 NOTE — OP NOTE
[address]                  [address] [facility], [address] [place] [address]                                OPERATIVE REPORT    PATIENT NAME: Roscoe Durant                 :        1975  MED REC NO:   [id number]                            ROOM:  ACCOUNT NO:   [de-identified]                           ADMIT DATE: 2019  PROVIDER:     Medardo Jeong DO    DATE OF PROCEDURE:  2019    OPERATING SURGEON:  Medardo Jeong D.O.    ASSISTANT:  Bonna Scheuermann, D.O.    PREOPERATIVE DIAGNOSIS:  Left Achilles tendon rupture. POSTOPERATIVE DIAGNOSIS:  Left Achilles tendon rupture. TITLE OF PROCEDURE:  Left Achilles tendon rupture direct repair. ANESTHESIA:  General with preoperative regional block. ESTIMATED BLOOD LOSS:  Minimal.    COMPLICATIONS:  None. INDICATIONS:  The patient is a healthy active 41-year-old male who  sustained abovementioned injury during basketball play. He was seen in  the office and diagnosis was made. Treatment options were discussed and  outlined. The patient elected for operative intervention despite  associated surgical risk. Risk and benefits of surgery outlined in  detail. He verbalized understanding of all that was discussed. All his  questions were addressed to satisfaction. He did elect to proceed with  the procedure as outlined. DESCRIPTION OF THE PROCEDURE:  The patient was brought to the operating  suite where he received a general anesthetic by the Department of  Anesthesia as well as 2 gm of Ancef intravenously. He was now carefully  positioned in the prone position on the operating table. All bony  prominences were carefully padded. The left lower extremity was  sterilely prepped and draped out in the usual fashion using Chloraprep  scrub. Surgical time-out was performed per protocol by all members of  the surgical team.  The leg was elevated and exsanguinated with an  Esmarch.   Tourniquet was set at 275 mmHg pressure. A longitudinal  incision was made of the medial border of the Achilles tendon. Skin was  incised with a scalpel. Electrocautery was taken through the  subcutaneous tissues. Full-thickness flaps were created. Peritenon was  identified and this was now incised longitudinally in line with the  incision. This was now carefully dissected off the Achilles tendon. The patient had traumatic rupture of the Achilles tendon at a more  proximal location in a typical watershed area, more in the myotendinous  juncture. The tendinous stumps were exposed through the wound. The  lacerated ends were sharply excised back to healthy tissue. We were  limited on the proximal extent due to the location of the rupture. The  tendinous edges were now tagged using a #5 FiberWire in a running  locked-[place] fashion. Both tails were now passed into the  contralateral stump regions and using the gift box technique tied with  the two tendinous ends well approximated. This was further imbricated  with Vicryl suture neatening the repair. The peritenon was now  reapproximated over the repair site using 5-0 Prolene. Wound was  irrigated. Subcutaneous tissue was now closed with Monocryl and skin  with nylon in simple interrupted fashion. Wounds were dressed with  antibiotic ointment, Xeroform, 4x4, fluffs, Webril, and a well-padded  posterior splint with the ankle in a plantarflexed position. Tourniquet  was let down at this juncture. The patient was now positioned back into  supine position on the [facility], was extubated uneventfully, and  taken to the postanesthesia care unit in stable condition. POSTOPERATIVE PLAN:  This patient will be discharged home today. This  was scheduled as outpatient procedure. He received a prescription for  aspirin and for pain control. He is to be nonweightbearing of the  affected extremity, maintain his splint, and elevation.   He will follow  up in the office in two weeks for repeat evaluation. Anticipate  transition to a short leg cast at that point.         Vandana Price DO    D: 02/20/2019 15:38:00       T: 02/21/2019 1:17:50     [email or web address]  Job#: [id number]     Doc#: [id number]    CC:

## 2019-03-06 DIAGNOSIS — S86.012D RUPTURE OF LEFT ACHILLES TENDON, SUBSEQUENT ENCOUNTER: Primary | ICD-10-CM

## 2019-03-08 ENCOUNTER — OFFICE VISIT (OUTPATIENT)
Dept: ORTHOPEDIC SURGERY | Age: 44
End: 2019-03-08
Payer: COMMERCIAL

## 2019-03-08 ENCOUNTER — HOSPITAL ENCOUNTER (OUTPATIENT)
Dept: GENERAL RADIOLOGY | Age: 44
Discharge: HOME | End: 2019-03-10
Payer: COMMERCIAL

## 2019-03-08 VITALS
DIASTOLIC BLOOD PRESSURE: 82 MMHG | TEMPERATURE: 99 F | RESPIRATION RATE: 18 BRPM | HEART RATE: 58 BPM | SYSTOLIC BLOOD PRESSURE: 140 MMHG

## 2019-03-08 DIAGNOSIS — S86.012D RUPTURE OF LEFT ACHILLES TENDON, SUBSEQUENT ENCOUNTER: Primary | ICD-10-CM

## 2019-03-08 DIAGNOSIS — S86.012D RUPTURE OF LEFT ACHILLES TENDON, SUBSEQUENT ENCOUNTER: ICD-10-CM

## 2019-03-08 PROCEDURE — 99024 POSTOP FOLLOW-UP VISIT: CPT | Performed by: ORTHOPAEDIC SURGERY

## 2019-03-08 PROCEDURE — 99213 OFFICE O/P EST LOW 20 MIN: CPT

## 2019-03-08 PROCEDURE — 73610 X-RAY EXAM OF ANKLE: CPT

## 2019-03-08 RX ORDER — OXYCODONE HYDROCHLORIDE AND ACETAMINOPHEN 5; 325 MG/1; MG/1
1 TABLET ORAL EVERY 6 HOURS PRN
COMMUNITY
End: 2019-05-16 | Stop reason: ALTCHOICE

## 2019-04-05 ENCOUNTER — OFFICE VISIT (OUTPATIENT)
Dept: ORTHOPEDIC SURGERY | Age: 44
End: 2019-04-05
Payer: COMMERCIAL

## 2019-04-05 ENCOUNTER — HOSPITAL ENCOUNTER (OUTPATIENT)
Dept: GENERAL RADIOLOGY | Age: 44
Discharge: HOME | End: 2019-04-07
Payer: COMMERCIAL

## 2019-04-05 VITALS — HEIGHT: 69 IN | WEIGHT: 194 LBS | BODY MASS INDEX: 28.73 KG/M2 | OXYGEN SATURATION: 98 % | HEART RATE: 59 BPM

## 2019-04-05 DIAGNOSIS — S86.012D RUPTURE OF LEFT ACHILLES TENDON, SUBSEQUENT ENCOUNTER: ICD-10-CM

## 2019-04-05 DIAGNOSIS — S86.012D RUPTURE OF LEFT ACHILLES TENDON, SUBSEQUENT ENCOUNTER: Primary | ICD-10-CM

## 2019-04-05 PROCEDURE — 99212 OFFICE O/P EST SF 10 MIN: CPT | Performed by: PHYSICIAN ASSISTANT

## 2019-04-05 PROCEDURE — 73610 X-RAY EXAM OF ANKLE: CPT

## 2019-04-05 PROCEDURE — 99024 POSTOP FOLLOW-UP VISIT: CPT | Performed by: PHYSICIAN ASSISTANT

## 2019-04-05 NOTE — PROGRESS NOTES
OP: DATE OF PROCEDURE:  02/20/2019     OPERATING SURGEON:  CARMINE Blanco     POSTOPERATIVE DIAGNOSIS:  Left Achilles tendon rupture.     TITLE OF PROCEDURE:  Left Achilles tendon rupture direct repair.     Subjective:  Juliana Robbins is approximately 6 weeks out from the above-mentioned procedure. Still has been limiting his weightbearing somewhat, remains in CAM walker boot with heel lifts. Has not been pulling heel lifts apart. Has not started physical therapy yet. Denies any new complaints or paresthesias. Denies a significant pain about the surgical site. Denies fevers or chills. Denies shortness of breath, chest pain, or calf pain. Review of Systems -    General ROS: negative for - chills, fatigue, fever or night sweats  Respiratory ROS: no cough, shortness of breath, or wheezing  Cardiovascular ROS: no chest pain or dyspnea on exertion  Gastrointestinal ROS: no abdominal pain, nausea, vomiting, diarrhea, constipation,or black or bloody stools  Genitourinary: no hematuria, dysuria, or incontinence   Musculoskeletal ROS: negative for -back or neck pain or stiffness, also see HPI  Neurological ROS: no TIA or stroke symptoms       Objective:    General: Alert and oriented X 3, normocephalic atraumatic, external ears and eye normal, sclera clear, no acute distress, respirations easy and unlabored with no audible wheezes, skin warm and dry, speech and dress appropriate for noted age, affect euthymic. Extremity:  left Lower Extremity Exam:    Incision over left heel, well approximated no signs of infection: No erythema, drainage, or warmth appreciated. Skin intact , no signs of infection. No erythema/induration/fluctuence present. mild swelling present. no ecchymosis present. Non tender to palpation over achilles tendon repair, Achilles tendon intact, negative Ortega test.   Demonstrates active ankle plantar/dorsiflexion/great toe extension.  5/5 plantarflexion intact  Sensation intact to light touch in sural/deep peroneal/superficial peroneal/saphenous/posterior tibial nerve distributions to foot/ankle. Palpable dorsalis pedis and posterior tibialis pulses, cap refill brisk in toes, foot warm/perfused. Compartments supple throughout thigh and leg. Calves soft non-tender. Pulse 59   Ht 5' 9\" (1.753 m)   Wt 194 lb (88 kg)   SpO2 98%   BMI 28.65 kg/m²     XR:  X-rays left ankle were obtained demonstrating no acute fracture, pathology, or deformity. Mortise view intact. Assessment:   Diagnosis Orders   1.  Rupture of left Achilles tendon, subsequent encounter  [address]       Plan:  WB:  Weight bearing as tolerated  Boot : On at all times can remove for bathing, and therapy  Remove last heel lift in two weeks, then ambulate in boot without heel lifts one more week start to transition into regular shoe  PT: Attend therapy following the achilles tendon protocol at the [facility]    Follow up in 6 weeks for recheck  Call if issues or concerns  Electronically signed by Irene Arroyo PA-C on 4/5/2019 at 10:59 AM

## 2019-04-05 NOTE — PATIENT INSTRUCTIONS
WB:  Weight bearing as tolerated  Boot : On at all times can remove for bathing, and therapy  Remove last heel lift in two weeks, then ambulate in boot without heel lifts one more week start to transition into regular shoe  PT: Attend therapy following the achilles tendon protocol at the [facility]    Follow up in 6 weeks for recheck  Call if issues or concerns

## 2019-04-10 ENCOUNTER — HOSPITAL ENCOUNTER (OUTPATIENT)
Dept: PHYSICAL THERAPY | Age: 44
Setting detail: THERAPIES SERIES
Discharge: HOME | End: 2019-04-10
Payer: COMMERCIAL

## 2019-04-10 PROCEDURE — 97110 THERAPEUTIC EXERCISES: CPT | Performed by: PHYSICAL THERAPIST

## 2019-04-10 PROCEDURE — 97161 PT EVAL LOW COMPLEX 20 MIN: CPT | Performed by: PHYSICAL THERAPIST

## 2019-04-10 ASSESSMENT — PAIN SCALES - GENERAL: PAINLEVEL_OUTOF10: 2

## 2019-04-10 ASSESSMENT — PAIN - FUNCTIONAL ASSESSMENT: PAIN_FUNCTIONAL_ASSESSMENT: PREVENTS OR INTERFERES SOME ACTIVE ACTIVITIES AND ADLS

## 2019-04-10 ASSESSMENT — PAIN DESCRIPTION - LOCATION: LOCATION: ANKLE

## 2019-04-10 ASSESSMENT — PAIN DESCRIPTION - PAIN TYPE: TYPE: SURGICAL PAIN

## 2019-04-10 ASSESSMENT — PAIN DESCRIPTION - FREQUENCY: FREQUENCY: INTERMITTENT

## 2019-04-10 ASSESSMENT — PAIN DESCRIPTION - DESCRIPTORS: DESCRIPTORS: ACHING

## 2019-04-10 NOTE — PROGRESS NOTES
Physical Therapy  Initial Assessment  Date: 4/10/2019  Patient Name: Elda Weaver  MRN: [id number]  : 1975        Subjective   General  Chart Reviewed: Yes  Referring Practitioner: Racquel Palomino.  Referral Date : 19  Diagnosis: s/p L achilles repair  PT Visit Information  Onset Date: 19  PT Insurance Information: [employer]an Turner  Total # of Visits Approved: 18  Total # of Visits to Date: 1  Subjective  Subjective: pt presents to therapy s/p L achilles repair done 19; injured achilles playing basketball in late January; no PMH for L LE per pt; pt was initially put in splint then serial heel lifts; presents in CAM boot with no asst device at WBAT, heel lift in place to be removed in 2 weeks; no c/o N/T or buckling L LE; sleep is fine; no MEDS at this time; RTD for follow-up 5/10/19  Pain Screening  Patient Currently in Pain: Yes  Pain Assessment  Pain Assessment: 0-10  Pain Level: 2  Pain Type: Surgical pain  Pain Location: Ankle  Pain Descriptors: Aching  Pain Frequency: Intermittent  Functional Pain Assessment: Prevents or interferes some active activities and ADLs  Vital Signs  Patient Currently in Pain: Yes    Objective     Observation/Palpation  Palpation: discomfort noted across bulk of achilles into plantar fascia   Observation: slight STS noted across distal achilles into B malleoli    AROM RLE (degrees)  RLE AROM: WNL  AROM LLE (degrees)  LLE General AROM: WNL for all ranges L knee/hip; L ankle:  PF/DF= 40*/0*, IN/EV= 12*/12*  AROM RUE (degrees)  RUE AROM : WNL    Strength Other  Other: grossly 5/5 across L hip/knee, 3-/5 across L ankle/foot      Additional Measures  Other: endurance for all prolonged activities is FAIR+  Sensation  Overall Sensation Status: WNL      Balance  Comments: static/dynamic balance is GOOD     Assessment   Conditions Requiring Skilled Therapeutic Intervention  Body structures, Functions, Activity limitations: Decreased ROM; Decreased strength;Decreased endurance;Decreased functional mobility   Assessment: pain noted across L achilles with all prolonged activities, 2/10   Prognosis: Good  Decision Making: Low Complexity  Activity Tolerance  Activity Tolerance: Patient Tolerated treatment well         Plan   Plan  Times per week: pt to be seen 2x/week/6-8 weeks   Current Treatment Recommendations: ROM, Strengthening, Endurance Training, Gait Training, Modalities, Home Exercise Program  Plan Comment: POC will procede per surgeon protocol     Goals  Short term goals  Time Frame for Short term goals: 3-4 weeks   Short term goal 1: decrease pain across L achilles for all prolonged activities, 0-1/10  Short term goal 2: restore AROM across L ankle to WNL for all ranges restoring normal/independent gait mechanics across L LE   Short term goal 3: increase strength across L ankle to grossly 4/5 for all ranges improving static/dynamic balance to GOOD/GOOD+  Short term goal 4: improve endurance for all prolonged activities to GOOD-  Long term goals  Time Frame for Long term goals : 6-8 weeks   Long term goal 1: increase strength across L ankle to grossly 4/5 for all ranges improving static/dynamic balance to GOOD+/NORMAL  Long term goal 2: improve endurance for all prolonged activities to GOOD/GOOD+  Long term goal 3: assure I with HEP for home management of condition         Tonja White, PT

## 2019-04-10 NOTE — PROGRESS NOTES
[address] [facility]                Phone: [phone]   Fax: [phone]    Physical Therapy Daily Treatment Note  Date:  4/10/2019    Patient Name:  Juan Corea    :  1975  MRN: [id number]    Evaluating therapist:  DOUGLAS Gilman               (4/10/19)  Restrictions/Precautions:  WBAT, in boot when up; remove last heel lift in two weeks  Diagnosis:  s/p L achilles tendon repair              (19)  Treatment Diagnosis:    Insurance/Certification information:  [address] [place] [address],[address]  Referring Physician:  Marta Locke of care signed (Y/N):    Visit# / total visits:    Pain level: 2/10   Time In:  Time Out:    Subjective:      Exercises:  Exercise/Equipment Resistance/Repetitions Other comments   StepOne              towel st 3x20s    ankle PF/DF 15x3s              IN/EV 15x3s    toe crunches 15x3s    fascial massage 5 min                                                                                              Other Therapeutic Activities:      Home Exercise Program:  provided 4/10/19    Manual Treatments:      Modalities:  IFC/ICE to L achilles     Timed Code Treatment Minutes: Total Treatment Minutes:      Treatment/Activity Tolerance:  [] Patient tolerated treatment well [] Patient limited by fatique  [] Patient limited by pain  [] Patient limited by other medical complications  [] Other:     Prognosis: [] Good [] Fair  [] Poor    Patient Requires Follow-up: [] Yes  [] No    Plan:   [] Continue per plan of care [] Alter current plan (see comments)  [] Plan of care initiated [] Hold pending MD visit [] Discharge  Plan for Next Session:      See Weekly Progress Note: []  Yes  []  No  Next due:        Electronically signed by:   Jessie Shirley

## 2019-04-12 ENCOUNTER — HOSPITAL ENCOUNTER (OUTPATIENT)
Dept: PHYSICAL THERAPY | Age: 44
Setting detail: THERAPIES SERIES
Discharge: HOME | End: 2019-04-12
Payer: COMMERCIAL

## 2019-04-12 PROCEDURE — 97530 THERAPEUTIC ACTIVITIES: CPT

## 2019-04-12 PROCEDURE — 97110 THERAPEUTIC EXERCISES: CPT

## 2019-04-12 NOTE — PROGRESS NOTES
[address] [facility]                Phone: [phone]   Fax: [phone]    Physical Therapy Daily Treatment Note  Date:  2019    Patient Name:  Parth Alexandra    :  1975  MRN: [id number]    Evaluating therapist:  DOUGLAS Mclain               (4/10/19)  Restrictions/Precautions:  WBAT, in boot when up; remove last heel lift in two weeks  Diagnosis:  s/p L achilles tendon repair              ()    Insurance/Certification information:  [id number] [facility],[address]  Referring Physician:  Virginia Javier of care signed (Y/N):    Visit# / total visits:    Pain level: 2/10       Time In:     1030  Time Out:   1025    Subjective:  Patient presents with walking boot L LE for first scheduled treatment session following initial evaluation. He reports s\"stinging\" pain in L achilles as 2/10 prior to session this morning. Patient did not bring L shoe for exercises out of boot. Exercises:  Exercise/Equipment Resistance/Repetitions Other comments   StepOne   10 min L1         Rocker board wt shifts 20x ea 3 way         Step ups fwd 2 x10  4\"         Calf stretch wedge 5 x20s         Total gym squats  BL 3 x15  L6         Elastic band PF 3 x15  blue         Standing march 2 x15           towel st 5 x20s 3 way for HEP   ankle PF/DF HEP              IN/EV HEP    toe crunches HEP    fascial massage HEP                                    Objective: Activities as listed per flow sheet above. no modalities. AROM DF to 5° PF to 50°     Strength R ankle grossly 3- to 3/5    Assessment:  patient performs all exercises well with good effort and pacing. AROM is improved from initial values. No significant increase in pain noted following session. Home Exercise Program:  provided 4/10/19    Manual Treatments:      Modalities:      Timed Code Treatment Minutes:       Total Treatment Minutes:      Treatment/Activity Tolerance:  [] Patient tolerated treatment well [] Patient limited

## 2019-04-16 ENCOUNTER — HOSPITAL ENCOUNTER (OUTPATIENT)
Dept: PHYSICAL THERAPY | Age: 44
Setting detail: THERAPIES SERIES
Discharge: HOME | End: 2019-04-16
Payer: COMMERCIAL

## 2019-04-16 PROCEDURE — 97110 THERAPEUTIC EXERCISES: CPT

## 2019-04-16 PROCEDURE — 97530 THERAPEUTIC ACTIVITIES: CPT

## 2019-04-16 NOTE — PROGRESS NOTES
[address] [facility]                Phone: [phone]   Fax: [phone]    Physical Therapy Daily Treatment Note  Date:  2019    Patient Name:  Catrachita Mclean    :  1975  MRN: [id number]    Evaluating therapist:  DOUGLAS Yang               (4/10/19)  Restrictions/Precautions:  WBAT, in boot when up; remove last heel lift in two weeks  Diagnosis:  s/p L achilles tendon repair              ()    Insurance/Certification information:  [id number] [facility],[address]  Referring Physician:  Karen Garduno of care signed (Y/N):    Visit# / total visits:   3/18  Pain level: 0/10       Time In:     1005  Time Out:  1102    Subjective:  Patient presents WBAT no AD with walking boot L LE for first of two scheduled treatment sessions this week. He reports  no pain in L achilles prior to session this morning. Exercises:  Exercise/Equipment Resistance/Repetitions Other comments   StepOne   10 min L1         Rocker board wt shifts 20x ea 3 way         SLS  L LE 5x 15s         Step ups fwd  L LE 2 x10  6\"         Calf stretch wedge 5 x20s         Total gym squats  BL 3 x15  L7         Elastic band PF 3 x15  purple         Standing march 2 x15 2#                   HS curls 2 x10  2#         LAQ pyramid  UL R/L 3 x10  20#         Stair climbing 15x 4 steps           towel st 5 x20s 3 way for HEP                     Objective: Activities as listed per flow sheet above. no modalities. Regular athletic shoe worn for treatment session  AROM DF to 7° PF to 55°     Strength R ankle grossly 3/5    Assessment:  patient performs all exercises well with good effort and pacing. AROM shows some improvement. Strength is mildly improved. Home Exercise Program:  provided 4/10/19    Manual Treatments:      Modalities:      Timed Code Treatment Minutes:       Total Treatment Minutes:      Treatment/Activity Tolerance:  [x] Patient tolerated treatment well [] Patient limited by justice  [] Patient limited by pain  [] Patient limited by other medical complications  [] Other:     Prognosis: [x] Good [] Fair  [] Poor    Patient Requires Follow-up: [x] Yes  [] No    Plan:   [x] Continue per plan of care [] Alter current plan (see comments)  [] Plan of care initiated [] Hold pending MD visit [] Discharge    Plan for Next Session:      See Weekly Progress Note: []  Yes  []  No  Next due:        Electronically signed by:  Marie Rapp

## 2019-04-19 ENCOUNTER — HOSPITAL ENCOUNTER (OUTPATIENT)
Dept: PHYSICAL THERAPY | Age: 44
Setting detail: THERAPIES SERIES
Discharge: HOME | End: 2019-04-19
Payer: COMMERCIAL

## 2019-04-19 PROCEDURE — 97530 THERAPEUTIC ACTIVITIES: CPT

## 2019-04-19 PROCEDURE — 97110 THERAPEUTIC EXERCISES: CPT

## 2019-04-19 NOTE — PROGRESS NOTES
[address] [facility]                Phone: [phone]   Fax: [phone]    Physical Therapy Daily Treatment Note  Date:  2019    Patient Name:  Katharine Dudley    :  1975  MRN: [id number]    Evaluating therapist:  DOUGLAS Sweeney               (4/10/19)  Restrictions/Precautions:  WBAT, in boot when up; remove last heel lift in two weeks  Diagnosis:  s/p L achilles tendon repair              ()    Insurance/Certification information:  [id number] [facility],[address]  Referring Physician:  Usha Schwartz of care signed (Y/N):    Visit# / total visits:     Pain level: 0/10        Time In:     7983  Time Out:   1058    Subjective:  Patient presents WBAT no AD with walking boot L LE for second of two scheduled treatment sessions this week. Exercises:  Exercise/Equipment Resistance/Repetitions Other comments   StepOne   10 min L1         Rocker board wt shifts 20x ea 3 way         SLS  L LE 5x 20s         Step ups fwd  L LE 2 x10  6\"         Calf stretch wedge 5 x20s         Total gym squats  BL 3 x15  L7         Calf raises wedge 3 x10 // bars 2 rails           Standing march 2 x15 2#                   HS curls 2 x10  2#         LAQ pyramid  L 3 x10  20#         Stair climbing 10x 4 steps         Geronimo ankle  PF/DF  IN/EV 2 x15 ea   L LE           towel st 5 x20s  DF                      Objective: Activities as listed per flow sheet above. no modalities. heel lift removed from boot today. Assessment:  Patient performs all exercises well with good effort and pacing. No pain reported in L achilles during or following session today. Home Exercise Program:  provided 4/10/19    Manual Treatments:      Modalities:      Timed Code Treatment Minutes:       Total Treatment Minutes:      Treatment/Activity Tolerance:  [x] Patient tolerated treatment well [] Patient limited by fatique  [] Patient limited by pain  [] Patient limited by other medical complications  [] Other:     Prognosis: [x] Good [] Fair  [] Poor    Patient Requires Follow-up: [x] Yes  [] No    Plan:   [x] Continue per plan of care [] Alter current plan (see comments)  [] Plan of care initiated [] Hold pending MD visit [] Discharge    Plan for Next Session:      See Weekly Progress Note: []  Yes  []  No  Next due:        Electronically signed by:  Beto Schultz

## 2019-04-23 ENCOUNTER — HOSPITAL ENCOUNTER (OUTPATIENT)
Dept: PHYSICAL THERAPY | Age: 44
Setting detail: THERAPIES SERIES
Discharge: HOME | End: 2019-04-23
Payer: COMMERCIAL

## 2019-04-23 PROCEDURE — 97530 THERAPEUTIC ACTIVITIES: CPT

## 2019-04-23 PROCEDURE — 97110 THERAPEUTIC EXERCISES: CPT

## 2019-04-23 NOTE — PROGRESS NOTES
[address] [facility]                Phone: [phone]   Fax: [phone]    Physical Therapy Daily Treatment Note  Date:  2019    Patient Name:  Walter Gabriel    :  1975  MRN: [id number]    Evaluating therapist:  DOUGLAS Frederick               (4/10/19)  Restrictions/Precautions:  WBAT, in boot when up; remove last heel lift in two weeks  Diagnosis:  s/p L achilles tendon repair              ()    Insurance/Certification information:  [employer]lene Galeazzi  Referring Physician:  Fouzia Sorto of care signed (Y/N):    Visit# / total visits:     Pain level: 0/10        Time In:     0931  Time Out:  1030      Subjective:  Patient presents WBAT no AD with walking boot L LE for first of two scheduled treatment sessions this week. He reports no pain this morning and no pain since previous session. Exercises:  Exercise/Equipment Resistance/Repetitions Other comments   StepOne   10 min L2         Rocker board wt shifts 20x ea 3 way         SLS  L LE 5x 20s         Step ups fwd  L LE 3 x10  6\"         Calf stretch wedge 5 x20s         Total gym squats  BL 3 x15  L7         Calf raises wedge 3 x10 // bars 2 rails           Standing march 2 x15 3#                   HS curls 3 x10 3#         LAQ pyramid  L 3 x10  20#         Stair climbing 10x 4 steps no rails ascend / 2 rails descend        sit<-->stand 20x         Merlin ankle  PF/DF  IN/EV 2 x15 ea   L LE           towel st 5 x20s  DF                      Objective: Activities as listed per flow sheet above. no modalities. modalities. Regular athletic shoe worn for treatment session  AROM DF to 8° PF to 55°     Strength R ankle grossly 3 to 3+/5  Gait:  significant deficits noted, limp decreased heel strike toe/off, widened ELIANA  Descending stair restricted due to lack of DF on L ankle requiring use of rails to maintain good stability and control.       Assessment:  Patient performs all exercises well with good effort

## 2019-04-26 ENCOUNTER — HOSPITAL ENCOUNTER (OUTPATIENT)
Dept: PHYSICAL THERAPY | Age: 44
Setting detail: THERAPIES SERIES
Discharge: HOME | End: 2019-04-26
Payer: COMMERCIAL

## 2019-04-26 PROCEDURE — 97110 THERAPEUTIC EXERCISES: CPT

## 2019-04-26 NOTE — PROGRESS NOTES
[address] [facility]                Phone: [phone]   Fax: [phone]    Physical Therapy Daily Treatment Note  Date:  2019    Patient Name:  Michael Rodarte    :  1975  MRN: [id number]    Evaluating therapist:  DOUGLAS Rios               (4/10/19)  Restrictions/Precautions:  WBAT, in boot when up; remove last heel lift in two weeks  Diagnosis:  s/p L achilles tendon repair              (07)    Insurance/Certification information:  [facility]  Referring Physician:  Sheri Brian of care signed (Y/N):    Visit# / total visits:     Pain level: 6/10         Time In:     0930  Time Out:   1031    Subjective:  Patient presents WBAT no AD with walking boot L LE for second of two scheduled treatment sessions this week. He reports increased pain soreness in L heel up into calf muscle for the past two days. Exercises:  Exercise/Equipment Resistance/Repetitions Other comments   StepOne   10 min L2         Rocker board wt shifts 20x ea 3 way         SLS  L LE          Step ups fwd  L LE          Calf stretch wedge 5 x20s         Total gym squats  BL 3 x15  L7         Calf raises wedge            Standing march 2 x15                    HS curls 3 x10          LAQ pyramid  L 3 x10  20#         Stair climbing  no rails ascend / 2 rails descend        sit<-->stand 20x         Geronimo ankle  PF/DF  IN/EV 2 x15 ea   L ankle           towel st 5 x20s  DF                      Objective: Activities as listed per flow sheet above. exercises reduced due to increased soreness/pain reported today. Treatment completed with CP to L heel/achilles x 15 min. Assessment:  Patient performs all exercises well with good effort and pacing. Home Exercise Program:  provided 4/10/19    Manual Treatments:      Modalities:      Timed Code Treatment Minutes:       Total Treatment Minutes:      Treatment/Activity Tolerance:  [x] Patient tolerated treatment well [] Patient limited by fatique  [] Patient limited by pain  [] Patient limited by other medical complications  [] Other:     Prognosis: [x] Good [] Fair  [] Poor    Patient Requires Follow-up: [x] Yes  [] No    Plan:   [x] Continue per plan of care [] Alter current plan (see comments)  [] Plan of care initiated [] Hold pending MD visit [] Discharge    Plan for Next Session:      See Weekly Progress Note: []  Yes  []  No  Next due:        Electronically signed by:  Anthony Bailon

## 2019-04-29 ENCOUNTER — HOSPITAL ENCOUNTER (OUTPATIENT)
Dept: PHYSICAL THERAPY | Age: 44
Setting detail: THERAPIES SERIES
Discharge: HOME | End: 2019-04-29
Payer: COMMERCIAL

## 2019-04-29 NOTE — PROGRESS NOTES
[address] [facility]                Phone: [phone]  Fax: [phone]    Physical Therapy  Cancellation/No-show Note  Patient Name:  Radha Smith  :  1975   Date:  2019    For today's appointment patient:  [x]  Cancelled  []  Rescheduled appointment  []  No-show     Reason given by patient:  []  Patient ill  []  Conflicting appointment  []  No transportation    []  Conflict with work  [x]  No reason given  []  Other:     Comments:      Electronically signed by:  Ann Dumont

## 2019-05-01 ENCOUNTER — HOSPITAL ENCOUNTER (OUTPATIENT)
Dept: PHYSICAL THERAPY | Age: 44
Setting detail: THERAPIES SERIES
Discharge: HOME | End: 2019-05-01
Payer: COMMERCIAL

## 2019-05-01 PROCEDURE — 97110 THERAPEUTIC EXERCISES: CPT

## 2019-05-01 PROCEDURE — 97530 THERAPEUTIC ACTIVITIES: CPT

## 2019-05-01 NOTE — PROGRESS NOTES
[address] [facility]                Phone: [phone]   Fax: [phone]    Physical Therapy Daily Treatment Note  Date:  2019    Patient Name:  Katharine Dudley    :  1975  MRN: [id number]    Evaluating therapist:  DOUGLAS Sweeney               (4/10/19)  Restrictions/Precautions:  WBAT, in boot when up; remove last heel lift in two weeks  Diagnosis:  s/p L achilles tendon repair              (64)    Insurance/Certification information:  [id number] [facility],[address]  Referring Physician:  Usha Schwartz of care signed (Y/N):    Visit# / total visits:      Pain level: 0-7/10         Time In:       0935  Time Out:   1033      Subjective:  Patient presents WBAT no AD with walking boot L LE for only treatment session this week due to cancellation of previous session. He reports no pain in L leg/ankle/achilles over the past 2-3 day. He states he had pain over the weekend on  (7/10), enough that he \"had to get off it\". He states he also has intermittent tightness across his achilles and calf muscles. He reports he is stretching his calf/achilles several times per day. Exercises:  Exercise/Equipment Resistance/Repetitions Other comments   StepOne   10 min L2         Rocker board wt shifts 20x ea 3 way                           squats 2 x10         SLS  L LE Airex foam  6x 20s         Step ups fwd  L LE 3 x10  6\"         Calf raises wedge 3 x10 // bars 2 rails  reduced WB on LEs   Calf stretch wedge 5 x20s         Total gym squats  BL 3 x15  L8         Standing  hip Abd  R/L 2 x15 ea 3#                    march 2 x15  3#                     HS curls  L 3 x10  3#         LAQ pyramid  L 3 x10  20#         Stair climbing 10x 4 steps no rails ascend / 2 rails descend        sit<-->stand 20x         Geronimo ankle  PF/DF  IN/EV 2 x15 ea   L ankle           towel st 5 x20s  DF                      Objective: Activities as listed per flow sheet above.  exercises reduced due to increased soreness/pain reported today. Treatment completed with CP to L heel/achilles x 15 min. AROM L ankle Df to 7°;  PF to 57°    Strength L ankle grossly 3+ to4-/5  Static/dynamic balance GOOD  Endurance for prolonged actifvities GOOD-/GOOD    Assessment:  Patient performs all exercises well with good effort and pacing. AROM is nearly equal to uninvolved for PF/DF. Strength of L ankle continue to show steady improvement. Home Exercise Program:  provided 4/10/19    Manual Treatments:      Modalities:      Timed Code Treatment Minutes:       Total Treatment Minutes:      Treatment/Activity Tolerance:  [x] Patient tolerated treatment well [] Patient limited by fatique  [] Patient limited by pain  [] Patient limited by other medical complications  [] Other:     Prognosis: [x] Good [] Fair  [] Poor    Patient Requires Follow-up: [x] Yes  [] No    Plan:   [x] Continue per plan of care [] Alter current plan (see comments)  [] Plan of care initiated [] Hold pending MD visit [] Discharge    Plan for Next Session:      See Weekly Progress Note: []  Yes  []  No  Next due:        Electronically signed by:  Morena Acuna

## 2019-05-07 ENCOUNTER — HOSPITAL ENCOUNTER (OUTPATIENT)
Dept: PHYSICAL THERAPY | Age: 44
Setting detail: THERAPIES SERIES
Discharge: HOME | End: 2019-05-07
Payer: COMMERCIAL

## 2019-05-07 PROCEDURE — 97110 THERAPEUTIC EXERCISES: CPT

## 2019-05-07 PROCEDURE — 97530 THERAPEUTIC ACTIVITIES: CPT

## 2019-05-07 NOTE — PROGRESS NOTES
[address] [facility]                Phone: [phone]   Fax: [phone]    Physical Therapy Daily Treatment Note  Date:  2019    Patient Name:  Cassidy Pryor    :  1975  MRN: [id number]    Evaluating therapist:  DOUGLAS Pleitez               (4/10/19)  Restrictions/Precautions:  WBAT, in boot when up; remove last heel lift in two weeks  Diagnosis:  s/p L achilles tendon repair              (83)    Insurance/Certification information:  [facility]  Referring Physician:  Romain Brewster of care signed (Y/N):    Visit# / total visits:     Pain level:  0-6/10        RTP 19    s/p 11 weeks L achilles repair     Time In:       932  Time Out:    933      Subjective:  Patient presents WBAT no AD with walking boot L LE for first of two scheduled treatment sessions this week. He reports no pain prior to session this morning. He states pain/soreness 5-6/10 at worst on bottom of his L heel and tightness in proximal L achilies area. Exercises:  Exercise/Equipment Resistance/Repetitions Other comments   StepOne   10 min L2         Rocker board wt shifts 20x ea 3 way                           squats 2 x20         SLS  L LE Airex foam  6x 20s         Step ups fwd  L LE 3 x10  6\"         Calf raises wedge 3 x10 // bars 2 rails     Calf stretch wedge 5 x20s         Total gym squats  BL 3 x15  L10         Standing  hip Abd  R/L 2 x15 ea 4#                    march BL 2 x15  4#                     HS curls  L 3 x10  4#         LAQ pyramid  L 4 x10  20#         Stair climbing 10x 4 steps no rails ascend / 2 rails descend        sit<-->stand 20x         Geronimo ankle  PF/DF  IN/EV 2 x15 ea   L ankle           towel st 5 x20s  DF                      Objective: Activities as listed per flow sheet above. exercises reduced due to increased soreness/pain reported today. Treatment completed with CP to L heel/achilles x 15 min.   AROM L ankle WFLs all ranges  Strength L ankle grossly 3+ to 4-/5  Static/dynamic balance GOOD  Endurance for prolonged actifvities GOOD-/GOOD    Assessment:  Patient performs all exercises well with good effort and pacing. AROM is nearly equal to uninvolved for PF/DF. Strength of L ankle continue to show steady improvement. Home Exercise Program:  provided 4/10/19    Manual Treatments:      Modalities:      Timed Code Treatment Minutes:       Total Treatment Minutes:      Treatment/Activity Tolerance:  [x] Patient tolerated treatment well [] Patient limited by fatique  [] Patient limited by pain  [] Patient limited by other medical complications  [] Other:     Prognosis: [x] Good [] Fair  [] Poor    Patient Requires Follow-up: [x] Yes  [] No    Plan:   [x] Continue per plan of care [] Alter current plan (see comments)  [] Plan of care initiated [] Hold pending MD visit [] Discharge    Plan for Next Session:      See Weekly Progress Note: []  Yes  []  No  Next due:        Electronically signed by:  Anthony Bailon

## 2019-05-10 ENCOUNTER — HOSPITAL ENCOUNTER (OUTPATIENT)
Dept: PHYSICAL THERAPY | Age: 44
Setting detail: THERAPIES SERIES
Discharge: HOME | End: 2019-05-10
Payer: COMMERCIAL

## 2019-05-10 PROCEDURE — 97110 THERAPEUTIC EXERCISES: CPT

## 2019-05-10 NOTE — PROGRESS NOTES
[address] [facility]                Phone: [phone]   Fax: [phone]    Physical Therapy Daily Treatment Note  Date:  5/10/2019    Patient Name:  Enedelia Ivey    :  1975  MRN: [id number]    Evaluating therapist:  DOUGLAS Chandler               (4/10/19)  Restrictions/Precautions:  WBAT, in boot when up; remove last heel lift in two weeks  Diagnosis:  s/p L achilles tendon repair              ()    Insurance/Certification information:  [id number] [facility],[address]  Referring Physician:  Deepthi Ocampo of care signed (Y/N):    Visit# / total visits:     Pain level: 0-10/10        RTP 19     Time In:       0943  Time Out:     1025      Subjective:  Patient presents WBAT no AD with walking boot L LE for second of two scheduled treatment sessions this week. He reports  pain was very high yesterday along distal 1/3 of calf/achilles into heall, 10/10. He states he \"got off of it\" due to the high pain. He reports it is feeling okay this morning. Exercises:  Exercise/Equipment Resistance/Repetitions Other comments   StepOne   10 min L2         Rocker board wt shifts 20x ea 3 way                           squats 2 x20         SLS  L LE Airex foam  nt         Step ups fwd  L LE 3 x10  6\"         Calf raises wedge nt     Calf stretch wedge 2 x20s         Total gym squats  BL 3 x15  L10         Standing  hip Abd  R/L 2 x15 ea 4#                     march BL 2 x15     4#                     HS curls  L 3 x10     4#         LAQ pyramid  L 4 x10  20#         Stair climbing 10x 4 steps no rails ascend / 2 rails descend        sit<-->stand 2 x15         [place] ankle  PF/DF  IN/EV nt           towel st 5 x20s  DF                      Objective: Activities as listed per flow sheet above. exercises reduced due to increased soreness/pain reported today. Assessment:  Patient performs all exercises well with good effort and pacing.       Home Exercise Program:  provided 4/10/19    Manual Treatments:      Modalities:      Timed Code Treatment Minutes:       Total Treatment Minutes:      Treatment/Activity Tolerance:  [x] Patient tolerated treatment well [] Patient limited by fatique  [] Patient limited by pain  [] Patient limited by other medical complications  [] Other:     Prognosis: [x] Good [] Fair  [] Poor    Patient Requires Follow-up: [x] Yes  [] No    Plan:   [x] Continue per plan of care [] Alter current plan (see comments)  [] Plan of care initiated [] Hold pending MD visit [] Discharge    Plan for Next Session:      See Weekly Progress Note: []  Yes  []  No  Next due:        Electronically signed by:  Aristeo Denny

## 2019-05-14 ENCOUNTER — HOSPITAL ENCOUNTER (OUTPATIENT)
Dept: PHYSICAL THERAPY | Age: 44
Setting detail: THERAPIES SERIES
Discharge: HOME | End: 2019-05-14
Payer: COMMERCIAL

## 2019-05-14 NOTE — PROGRESS NOTES
[address] [facility]                Phone: [phone]  Fax: [phone]    Physical Therapy  Cancellation/No-show Note  Patient Name:  Michael Rodarte  :  1975   Date:  2019    For today's appointment patient:  [x]  Cancelled  []  Rescheduled appointment  []  No-show     Reason given by patient:  []  Patient ill  [x]  Conflicting appointment  []  No transportation    []  Conflict with work  []  No reason given  []  Other:     Comments:      Electronically signed by:   DOUGLAS Morales

## 2019-05-16 ENCOUNTER — OFFICE VISIT (OUTPATIENT)
Dept: ORTHOPEDIC SURGERY | Age: 44
End: 2019-05-16
Payer: COMMERCIAL

## 2019-05-16 VITALS
BODY MASS INDEX: 28.79 KG/M2 | HEIGHT: 69 IN | WEIGHT: 194.4 LBS | SYSTOLIC BLOOD PRESSURE: 143 MMHG | DIASTOLIC BLOOD PRESSURE: 82 MMHG | HEART RATE: 88 BPM

## 2019-05-16 DIAGNOSIS — S86.012D RUPTURE OF LEFT ACHILLES TENDON, SUBSEQUENT ENCOUNTER: Primary | ICD-10-CM

## 2019-05-16 PROCEDURE — 99212 OFFICE O/P EST SF 10 MIN: CPT | Performed by: PHYSICIAN ASSISTANT

## 2019-05-16 PROCEDURE — 99024 POSTOP FOLLOW-UP VISIT: CPT | Performed by: PHYSICIAN ASSISTANT

## 2019-05-16 NOTE — PATIENT INSTRUCTIONS
Continue Weightbearing as tolerated   Continue to progress with wearing a regular tennis shoe  Recommend that you continue with therapy to continue to improve motion and walking pattern and progress with dynamic activities  Continue to massage out the bottom of the foot as instructed by PT  Recommended to use over the counter Tylenol or Ibuprofen for pain    Follow up in 8 weeks for reevaluation

## 2019-05-16 NOTE — PROGRESS NOTES
OP: SURGEON:  Siobhan Jaime D.O.   DATE OF PROCEDURE:  02/20/2019  PROCEDURE:  Left Achilles tendon rupture direct repair. Subjective:  Juliana Robbins is approximately 3 months follow-up from the above surgery. Patient is WBAT on that extremity. He ambulates with no assistive device, patient now walking in a regular shoe. Patient states that he weaned out of the boot about 1.5 weeks ago. Pain to extremity is mild and is not taking pain medication, patient states that since coming out of the boot has had increasing sharp pain that is over the achilles tendon and posterior leg, not long lasting. They denies numbness, tingling, weakness. Patient continues to work with therapy. Patient has started to complain of some left heel pain, has been improving with golf ball massage per PT. Patient not taking OTC medicine for pain when it occurs. Review of Systems -    General ROS: negative for - chills, fatigue, fever or night sweats  Respiratory ROS: no cough, shortness of breath, or wheezing  Cardiovascular ROS: no chest pain or dyspnea on exertion  Gastrointestinal ROS: no abdominal pain, nausea, vomiting, diarrhea, constipation,or black or bloody stools  Genitourinary: no hematuria, dysuria, or incontinence   Musculoskeletal ROS: negative for -back or neck pain or stiffness, also see HPI  Neurological ROS: no TIA or stroke symptoms       Objective:    General: Alert and oriented X 3, normocephalic atraumatic, external ears and eye normal, sclera clear, no acute distress, respirations easy and unlabored with no audible wheezes, skin warm and dry, speech and dress appropriate for noted age, affect euthymic.     Extremity:  Left Lower Extremity  Skin clean dry and intact, without signs of infection  Incision over posterior left ankle well healed without signs of redness, warmth or drainage, very mild TTP over incision, no appreciated swelling  No TTP on exam today over achilles tendon but states that when he does have pain it is at the proximal aspect of achilles  Compartments supple throughout thigh and leg  Calf supple and nontender  Demonstrates active knee flexion/extension, ankle plantar/dorsiflexion/great toe extension. Achilles tendon palpable and intact  Negative Ortega test  5/5 strength with plantarflexion and dorsiflexion  ROM of ankle, dorsiflexion to neutral   States sensation intact to touch in sural/deep peroneal/superficial peroneal/saphenous/posterior tibial nerve distributions to foot/ankle. Palpable dorsalis pedis and posterior tibialis pulses, cap refill brisk in toes, foot warm/perfused. BP (!) 143/82 (Site: Left Upper Arm, Position: Sitting, Cuff Size: Large Adult)   Pulse 88   Ht 5' 9\" (1.753 m)   Wt 194 lb 6.4 oz (88.2 kg)   BMI 28.71 kg/m²     Assessment:   Diagnosis Orders   1. Rupture of left Achilles tendon, subsequent encounter         Plan:  Continue Weightbearing as tolerated   Continue to progress with wearing a regular tennis shoe  Recommend that you continue with therapy to continue to improve motion and walking pattern and progress with dynamic activities. Can progress to the 3 month juwan on treatment protocol previously provided to PT  Continue to massage out the bottom of the foot as instructed by PT  Recommended to use over the counter Tylenol or Ibuprofen for pain  Patient seen with Dr. Estelita Soliz today    Follow up in 8 weeks for reevaluation    Electronically signed by Roberto López PA-C on 5/16/2019 at 12:19 PM  Note: This report was completed using [employer] voiced recognition software. Every effort has been made to ensure accuracy; however, inadvertent computerized transcription errors may be present.

## 2019-05-17 ENCOUNTER — HOSPITAL ENCOUNTER (OUTPATIENT)
Dept: PHYSICAL THERAPY | Age: 44
Setting detail: THERAPIES SERIES
Discharge: HOME | End: 2019-05-17
Payer: COMMERCIAL

## 2019-05-17 PROCEDURE — 97530 THERAPEUTIC ACTIVITIES: CPT | Performed by: PHYSICAL THERAPIST

## 2019-05-17 PROCEDURE — 97110 THERAPEUTIC EXERCISES: CPT | Performed by: PHYSICAL THERAPIST

## 2019-05-17 NOTE — PROGRESS NOTES
[address] [facility]                Phone: [phone]   Fax: [phone]    Physical Therapy Daily Treatment Note  Date:  2019    Patient Name:  Paulo Araiza    :  1975  MRN: [id number]    Evaluating therapist:  DOUGLAS White               (4/10/19)  Restrictions/Precautions:  WBAT, in boot when up; remove last heel lift in two weeks  Diagnosis:  s/p L achilles tendon repair              ()    Insurance/Certification information:  [id number] [facility],[address]  Referring Physician:  Chapo Martinez of care signed (Y/N):    Visit# / total visits:   10/18  Pain level: 0-10/10        RTP 19     Time In:       1105  Time Out:     1157      Subjective:          Exercises:  Exercise/Equipment Resistance/Repetitions Other comments   StepOne   10 min L2         Rocker board wt shifts 20x ea 3 way                           squats 2 x20         SLS  L LE Airex foam  nt         Step ups fwd  L LE 3 x10  6\"         Calf raises wedge nt     Calf stretch wedge 2 x20s         Total gym squats  BL 3 x15  L10         Standing  hip Abd  R/L 2 x15 ea 4#                     march BL 2 x15     4#                     HS curls  L 3 x10     4#         LAQ pyramid  L 4 x10  20#         Stair climbing 10x 4 steps no rails ascend / 2 rails descend        sit<-->stand 2 x15         [place] ankle  PF/DF  IN/EV nt           towel st 5 x20s  DF                      Objective: Activities as listed per flow sheet above. exercises reduced due to increased soreness/pain reported today. Assessment:  Patient performs all exercises well with good effort and pacing. Home Exercise Program:  provided 4/10/19    Manual Treatments:      Modalities:      Timed Code Treatment Minutes:       Total Treatment Minutes:      Treatment/Activity Tolerance:  [x] Patient tolerated treatment well [] Patient limited by fatique  [] Patient limited by pain  [] Patient limited by other medical complications  [] Other: Prognosis: [x] Good [] Fair  [] Poor    Patient Requires Follow-up: [x] Yes  [] No    Plan:   [x] Continue per plan of care [] Alter current plan (see comments)  [] Plan of care initiated [] Hold pending MD visit [] Discharge    Plan for Next Session:      See Weekly Progress Note: []  Yes  []  No  Next due:        Electronically signed by:   Mena Luevano ATC

## 2019-05-17 NOTE — PROGRESS NOTES
S:  pt presents to therapy for one of two scheduled visits this week, having cancelled on 7/87/ due to conflicting appt; at week's end he reports that his ankle/foot are doing well with pain level down to 0-2/10 with most prolonged activities; no c/o buckling or LOB over last week's time; HEP going well per pt    O:  performed the exercises/treatments as written in the flowsheet for the week ending 5/17/19; initiated HEP for home management of condition; AROM L ankle WNL for all ranges; strength across L ankle grossly 4, 4+/5 for all planes    A:  pranav tx well; pt able to perform all requested tasks with good form and pacing noted; AROM/strength across L ankle show great improvement across all ranges since eval; gait stable wtih normal mechanics noted L LE; static/dynamic balance is GOOD/GOOD+; endurance for all prolonged activities is GOOD/GOOD+    P:  cont with POC of stretching/strengthening for L ankle/foot

## 2019-05-21 ENCOUNTER — HOSPITAL ENCOUNTER (OUTPATIENT)
Dept: PHYSICAL THERAPY | Age: 44
Setting detail: THERAPIES SERIES
Discharge: HOME | End: 2019-05-21
Payer: COMMERCIAL

## 2019-05-21 PROCEDURE — 97530 THERAPEUTIC ACTIVITIES: CPT | Performed by: PHYSICAL THERAPIST

## 2019-05-21 PROCEDURE — 97110 THERAPEUTIC EXERCISES: CPT | Performed by: PHYSICAL THERAPIST

## 2019-05-21 NOTE — PROGRESS NOTES
[address] [facility]                Phone: [phone]   Fax: [phone]    Physical Therapy Daily Treatment Note  Date:  2019    Patient Name:  Mary Byrd    :  1975  MRN: [id number]    Evaluating therapist:  DOUGLAS Gillette               (4/10/19)  Restrictions/Precautions:  WBAT, in boot when up; remove last heel lift in two weeks  Diagnosis:  s/p L achilles tendon repair              ()    Insurance/Certification information:  [id number] [facility],[address]  Referring Physician:  Ana Rosa Garnett of care signed (Y/N):    Visit# / total visits:     Pain level: 0-10/10        RTP 19     Time In:       1002  Time Out:     1054      Subjective:          Exercises:  Exercise/Equipment Resistance/Repetitions Other comments   StepOne   10 min L2         Rocker board wt shifts 20x ea 3 way                           squats 2 x20         SLS  L LE Airex foam  nt         Step ups fwd  L LE 3 x10  6\"         Calf raises wedge nt     Calf stretch wedge 2 x20s         Total gym squats  BL 3 x15  L10         Standing  hip Abd  R/L 2 x15 ea 4#                     march BL 2 x15     4#                     HS curls  L 3 x10     4#         LAQ pyramid  L 4 x10  20#         Stair climbing 10x 4 steps no rails ascend / 2 rails descend        sit<-->stand 2 x15         [place] ankle  PF/DF  IN/EV nt           towel st 5 x20s  DF                      Objective: Activities as listed per flow sheet above. exercises reduced due to increased soreness/pain reported today. Assessment:  Patient performs all exercises well with good effort and pacing. Home Exercise Program:  provided 4/10/19    Manual Treatments:      Modalities:      Timed Code Treatment Minutes:       Total Treatment Minutes:      Treatment/Activity Tolerance:  [x] Patient tolerated treatment well [] Patient limited by fatique  [] Patient limited by pain  [] Patient limited by other medical complications  [] Other: Prognosis: [x] Good [] Fair  [] Poor    Patient Requires Follow-up: [x] Yes  [] No    Plan:   [x] Continue per plan of care [] Alter current plan (see comments)  [] Plan of care initiated [] Hold pending MD visit [] Discharge    Plan for Next Session:      See Weekly Progress Note: []  Yes  []  No  Next due:        Electronically signed by:   Betsy Belle ATC

## 2019-06-11 ENCOUNTER — HOSPITAL ENCOUNTER (OUTPATIENT)
Dept: PHYSICAL THERAPY | Age: 44
Setting detail: THERAPIES SERIES
Discharge: HOME | End: 2019-06-11
Payer: COMMERCIAL

## 2019-06-11 PROCEDURE — 97110 THERAPEUTIC EXERCISES: CPT | Performed by: PHYSICAL THERAPIST

## 2019-06-11 PROCEDURE — 97530 THERAPEUTIC ACTIVITIES: CPT | Performed by: PHYSICAL THERAPIST

## 2019-06-11 NOTE — PROGRESS NOTES
[address] [facility]                Phone: [phone]   Fax: [phone]    Physical Therapy Daily Treatment Note  Date:  2019    Patient Name:  Jesus Jesus    :  1975  MRN: [id number]    Evaluating therapist:  DOUGLAS Carroll               (4/10/19)  Restrictions/Precautions:  WBAT, in boot when up; remove last heel lift in two weeks  Diagnosis:  s/p L achilles tendon repair              ()    Insurance/Certification information:  [id number] [facility],[address]  Referring Physician:  Andrea Beauchampast of care signed (Y/N):    Visit# / total visits:     Pain level: 0-10/10        RTP 19     Time In:       1002  Time Out:     1055      Subjective:          Exercises:  Exercise/Equipment Resistance/Repetitions Other comments   StepOne   10 min L2         Rocker board wt shifts 20x ea 3 way                           squats 2 x20         SLS  L LE Airex foam  nt         Step ups fwd  L LE 3 x10x8\"         Calf raises wedge 2x15     Calf stretch wedge 2 x20s         Total gym squats  BL 3 x15  L10         Standing  hip Abd  R/L 2 x15 ea 4#                     march BL 2 x15     4#                     HS curls  L 3 x10     4#         LAQ pyramid  L 4 x10  30#         Stair climbing 10x 4 steps no rails           Objective: Activities as listed per flow sheet above. exercises reduced due to increased soreness/pain reported today. Assessment:  Patient performs all exercises well with good effort and pacing. Home Exercise Program:  provided 4/10/19    Manual Treatments:      Modalities:      Timed Code Treatment Minutes:       Total Treatment Minutes:      Treatment/Activity Tolerance:  [x] Patient tolerated treatment well [] Patient limited by fatique  [] Patient limited by pain  [] Patient limited by other medical complications  [] Other:     Prognosis: [x] Good [] Fair  [] Poor    Patient Requires Follow-up: [x] Yes  [] No    Plan:   [x] Continue per plan of care [] Alter current plan (see comments)  [] Plan of care initiated [] Hold pending MD visit [] Discharge    Plan for Next Session:      See Weekly Progress Note: []  Yes  []  No  Next due:        Electronically signed by:   Mena Luevano ATC

## 2019-06-18 ENCOUNTER — HOSPITAL ENCOUNTER (OUTPATIENT)
Dept: PHYSICAL THERAPY | Age: 44
Setting detail: THERAPIES SERIES
Discharge: HOME | End: 2019-06-18
Payer: COMMERCIAL

## 2019-06-18 PROCEDURE — 97530 THERAPEUTIC ACTIVITIES: CPT | Performed by: PHYSICAL THERAPIST

## 2019-06-18 PROCEDURE — 97110 THERAPEUTIC EXERCISES: CPT | Performed by: PHYSICAL THERAPIST

## 2019-06-18 NOTE — PROGRESS NOTES
[address] [facility]                Phone: [phone]   Fax: [phone]    Physical Therapy Daily Treatment Note  Date:  2019    Patient Name:  Elda Weaver    :  1975  MRN: [id number]    Evaluating therapist:  DOUGLAS Steen               (4/10/19)  Restrictions/Precautions:  WBAT, in boot when up; remove last heel lift in two weeks  Diagnosis:  s/p L achilles tendon repair              ()    Insurance/Certification information:  [id number] [facility],[address]  Referring Physician:  Ally Dumas of care signed (Y/N):    Visit# / total visits:     Pain level: 0-10/10        RTP 19     Time In:       956  Time Out:     1044      Subjective:          Exercises:  Exercise/Equipment Resistance/Repetitions Other comments   StepOne   10 min L2         Rocker board wt shifts 20x ea 3 way                           squats 2 x20         SLS  L LE Airex foam  nt         Step ups fwd  L LE 3 x10x8\"         Calf raises wedge 2x15     Calf stretch wedge 2 x20s         leg press [id number]         flex band:  A/P 2 min                     M/L 2 min                     marching  2 min          BOSU 4x30s                      Objective: Activities as listed per flow sheet above. exercises reduced due to increased soreness/pain reported today. Assessment:  Patient performs all exercises well with good effort and pacing. Home Exercise Program:  provided 4/10/19    Manual Treatments:      Modalities:      Timed Code Treatment Minutes:       Total Treatment Minutes:      Treatment/Activity Tolerance:  [x] Patient tolerated treatment well [] Patient limited by fatique  [] Patient limited by pain  [] Patient limited by other medical complications  [] Other:     Prognosis: [x] Good [] Fair  [] Poor    Patient Requires Follow-up: [x] Yes  [] No    Plan:   [x] Continue per plan of care [] Alter current plan (see comments)  [] Plan of care initiated [] Hold pending MD visit [] Discharge    Plan for Next Session:      See Weekly Progress Note: []  Yes  []  No  Next due:        Electronically signed by:   Valentino Felder ATC

## 2019-06-20 ENCOUNTER — HOSPITAL ENCOUNTER (OUTPATIENT)
Dept: PHYSICAL THERAPY | Age: 44
Setting detail: THERAPIES SERIES
Discharge: HOME | End: 2019-06-20
Payer: COMMERCIAL

## 2019-06-20 PROCEDURE — 97530 THERAPEUTIC ACTIVITIES: CPT | Performed by: PHYSICAL THERAPIST

## 2019-06-20 PROCEDURE — 97110 THERAPEUTIC EXERCISES: CPT | Performed by: PHYSICAL THERAPIST

## 2019-06-20 NOTE — PROGRESS NOTES
S:  pt presents to therapy for two of two scheduled visits this week;  at week's end he continues to report that his ankle/foot are doing well with pain level down to 0-2/10 with most prolonged activities; no c/o buckling or LOB over last week's time; HEP going well per pt    O:  performed the exercises/treatments as written in the flowsheet for the week ending 6/21/19;  initiated standing dynamic band activities/BOSU marching/treadmill ambulation on incline; AROM L ankle WNL for all ranges; strength across L ankle grossly  4+/5 for all planes    A:  pranav tx well; pt able to perform all requested tasks with good form and pacing noted; AROM/strength across L ankle show great improvement across all ranges since eval; gait stable wtih normal mechanics noted L LE; static/dynamic balance is GOOD/GOOD+; endurance for all prolonged activities is GOOD/GOOD+    P:  cont with POC of stretching/strengthening for L ankle/foot

## 2019-06-28 ENCOUNTER — HOSPITAL ENCOUNTER (OUTPATIENT)
Dept: PHYSICAL THERAPY | Age: 44
Setting detail: THERAPIES SERIES
Discharge: HOME | End: 2019-06-28
Payer: COMMERCIAL

## 2019-06-28 PROCEDURE — 97530 THERAPEUTIC ACTIVITIES: CPT | Performed by: PHYSICAL THERAPIST

## 2019-06-28 PROCEDURE — 97110 THERAPEUTIC EXERCISES: CPT | Performed by: PHYSICAL THERAPIST

## 2019-06-28 NOTE — PROGRESS NOTES
[address] [facility]                Phone: [phone]   Fax: [phone]    Physical Therapy Daily Treatment Note  Date:  2019    Patient Name:  Marisa Proctor    :  1975  MRN: [id number]    Evaluating therapist:  DOUGLAS Hawk               (4/10/19)  Restrictions/Precautions:  WBAT, in boot when up; remove last heel lift in two weeks  Diagnosis:  s/p L achilles tendon repair              ()    Ins[facility]Certification [facility]  [id number] [facility]  Referring Physician:  Nohemi Sykes                (19)  Plan of care signed (Y/N):    Visit# / total visits:   15/18  Pain level: 0-10/10      Time In:       1021  Time Out:     1126      Subjective:          Exercises:  Exercise/Equipment Resistance/Repetitions Other comments   StepOne   10 min L2         Rocker board wt shifts 20x ea 3 way                           squats 2 x20         Step ups fwd  L LE 3 x10x8\"         Calf raises wedge 2x15     Calf stretch wedge 2 x20s         leg press [id number]         flex band:  A/P 3 min                     M/L 2 min                     marching  3 min          BOSU 4x30s          treadmill (7%) 5 min @ MPH            Objective:       Assessment:      Home Exercise Program:  provided 4/10/19    Manual Treatments:      Modalities:      Timed Code Treatment Minutes: Total Treatment Minutes:      Treatment/Activity Tolerance:  [x] Patient tolerated treatment well [] Patient limited by fatique  [] Patient limited by pain  [] Patient limited by other medical complications  [] Other:     Prognosis: [x] Good [] Fair  [] Poor    Patient Requires Follow-up: [x] Yes  [] No    Plan:   [x] Continue per plan of care [] Alter current plan (see comments)  [] Plan of care initiated [] Hold pending MD visit [] Discharge    Plan for Next Session:      See Weekly Progress Note: []  Yes  []  No  Next due:        Electronically signed by:   DOUGLAS Snyder

## 2019-06-28 NOTE — PROGRESS NOTES
S:  pt presents to therapy for one of two scheduled visits this week, having been a no-show on 6/26/19;  at week's end he continues to report that his ankle/foot are doing well with pain level down to 0-2/10 with most prolonged activities; no c/o buckling or LOB over last week's time; HEP going well per pt    O:  performed the exercises/treatments as written in the flowsheet for the week ending 6/28/19;  AROM L ankle WNL for all ranges; strength across L ankle grossly  4+/5 for all planes    A:  pranav tx well; pt able to perform all requested tasks with good form and pacing noted; AROM/strength across L ankle show great improvement across all ranges since eval; gait stable wtih normal mechanics noted L LE; static/dynamic balance is GOOD/GOOD+; endurance for all prolonged activities is GOOD/GOOD+    P:  cont with POC of stretching/strengthening for L ankle/foot

## 2019-07-11 ENCOUNTER — OFFICE VISIT (OUTPATIENT)
Dept: ORTHOPEDIC SURGERY | Age: 44
End: 2019-07-11
Payer: COMMERCIAL

## 2019-07-11 VITALS
WEIGHT: 196 LBS | DIASTOLIC BLOOD PRESSURE: 94 MMHG | BODY MASS INDEX: 29.03 KG/M2 | SYSTOLIC BLOOD PRESSURE: 152 MMHG | HEART RATE: 64 BPM | HEIGHT: 69 IN

## 2019-07-11 DIAGNOSIS — S86.012A RUPTURE OF LEFT ACHILLES TENDON, INITIAL ENCOUNTER: Primary | ICD-10-CM

## 2019-07-11 PROCEDURE — 99212 OFFICE O/P EST SF 10 MIN: CPT | Performed by: NURSE PRACTITIONER

## 2019-07-11 PROCEDURE — 99212 OFFICE O/P EST SF 10 MIN: CPT

## 2019-07-11 NOTE — PROGRESS NOTES
OP: DATE OF PROCEDURE:  02/20/2019    OPERATING SURGEON:  Nathan Muir D.O.    TITLE OF PROCEDURE:  Left Achilles tendon rupture direct repair. Subjective:  Juliana Robbins is approximately 5 months follow-up from the above surgery. Patient is WBAT on that extremity. He ambulates with no assistive device, none. Pain to extremity is none and is not taking pain medication. He denies numbness, tingling, weakness. Denies Calf pain. . Patient is participating in therapy. He has even returned to jogging in the park. He questions when is is allowed to return to work. He has good movement and is happy with his progress. Review of Systems -    General ROS: negative for - chills, fatigue, fever or night sweats  Respiratory ROS: no cough, shortness of breath, or wheezing  Cardiovascular ROS: no chest pain or dyspnea on exertion  Gastrointestinal ROS: no abdominal pain, nausea, vomiting, diarrhea, constipation,or black or bloody stools  Genitourinary: no hematuria, dysuria, or incontinence   Musculoskeletal ROS: negative for -back or neck pain or stiffness, also see HPI  Neurological ROS: no TIA or stroke symptoms     Objective:    General: Alert and oriented X 3, normocephalic atraumatic, external ears and eye normal, sclera clear, no acute distress, respirations easy and unlabored with no audible wheezes, skin warm and dry, speech and dress appropriate for noted age, affect euthymic. Extremity:  Left Lower Extremity  Skin clean dry and intact, without signs of infection  Incisions well approximated without signs of redness, warmth or drainage  No edema noted  Compartments supple throughout thigh and leg,   Calf supple and nontender  Demonstrates active knee flexion/extension, ankle plantar/dorsiflexion/great toe extension. States sensation intact to touch in sural/deep peroneal/superficial peroneal/saphenous/posterior tibial nerve distributions to foot/ankle.    Palpable dorsalis pedis and posterior

## 2019-07-28 ENCOUNTER — APPOINTMENT (OUTPATIENT)
Dept: GENERAL RADIOLOGY | Age: 44
End: 2019-07-28
Payer: COMMERCIAL

## 2019-07-28 ENCOUNTER — HOSPITAL ENCOUNTER (EMERGENCY)
Age: 44
Discharge: HOME | End: 2019-07-28
Payer: COMMERCIAL

## 2019-07-28 VITALS
WEIGHT: 194 LBS | HEIGHT: 69 IN | BODY MASS INDEX: 28.73 KG/M2 | HEART RATE: 64 BPM | DIASTOLIC BLOOD PRESSURE: 77 MMHG | TEMPERATURE: 98.7 F | OXYGEN SATURATION: 99 % | RESPIRATION RATE: 16 BRPM | SYSTOLIC BLOOD PRESSURE: 147 MMHG

## 2019-07-28 DIAGNOSIS — M25.572 ACUTE LEFT ANKLE PAIN: Primary | ICD-10-CM

## 2019-07-28 PROCEDURE — 99283 EMERGENCY DEPT VISIT LOW MDM: CPT

## 2019-07-28 PROCEDURE — 73610 X-RAY EXAM OF ANKLE: CPT

## 2019-07-28 RX ORDER — NAPROXEN 500 MG/1
500 TABLET ORAL 2 TIMES DAILY
Qty: 14 TABLET | Refills: 0 | Status: SHIPPED | OUTPATIENT
Start: 2019-07-28 | End: 2021-08-06 | Stop reason: ALTCHOICE

## 2019-07-28 ASSESSMENT — PAIN DESCRIPTION - LOCATION: LOCATION: FOOT

## 2019-07-28 ASSESSMENT — PAIN DESCRIPTION - FREQUENCY: FREQUENCY: CONTINUOUS

## 2019-07-28 ASSESSMENT — PAIN DESCRIPTION - PAIN TYPE: TYPE: ACUTE PAIN

## 2019-07-28 ASSESSMENT — PAIN DESCRIPTION - ORIENTATION: ORIENTATION: LEFT

## 2019-07-28 ASSESSMENT — PAIN DESCRIPTION - DESCRIPTORS: DESCRIPTORS: CRAMPING;SHARP

## 2019-07-28 ASSESSMENT — PAIN SCALES - GENERAL: PAINLEVEL_OUTOF10: 7

## 2019-08-02 ENCOUNTER — OFFICE VISIT (OUTPATIENT)
Dept: FAMILY MEDICINE CLINIC | Age: 44
End: 2019-08-02
Payer: COMMERCIAL

## 2019-08-02 VITALS
WEIGHT: 198 LBS | SYSTOLIC BLOOD PRESSURE: 128 MMHG | HEART RATE: 66 BPM | OXYGEN SATURATION: 99 % | HEIGHT: 69 IN | BODY MASS INDEX: 29.33 KG/M2 | RESPIRATION RATE: 16 BRPM | DIASTOLIC BLOOD PRESSURE: 70 MMHG

## 2019-08-02 DIAGNOSIS — S86.012A PARTIAL TEAR OF LEFT ACHILLES TENDON, INITIAL ENCOUNTER: Primary | ICD-10-CM

## 2019-08-02 PROCEDURE — G8427 DOCREV CUR MEDS BY ELIG CLIN: HCPCS | Performed by: FAMILY MEDICINE

## 2019-08-02 PROCEDURE — 99213 OFFICE O/P EST LOW 20 MIN: CPT | Performed by: FAMILY MEDICINE

## 2019-08-02 PROCEDURE — G8419 CALC BMI OUT NRM PARAM NOF/U: HCPCS | Performed by: FAMILY MEDICINE

## 2019-08-02 PROCEDURE — 1036F TOBACCO NON-USER: CPT | Performed by: FAMILY MEDICINE

## 2019-08-02 ASSESSMENT — ENCOUNTER SYMPTOMS
VOMITING: 0
BACK PAIN: 0
APNEA: 0
DIARRHEA: 0
CONSTIPATION: 0
RHINORRHEA: 0
SHORTNESS OF BREATH: 0
SINUS PRESSURE: 0
BLOOD IN STOOL: 0
ABDOMINAL PAIN: 0
CHEST TIGHTNESS: 0
COUGH: 0
EYE PAIN: 0
SORE THROAT: 0
WHEEZING: 0
EYE REDNESS: 0
COLOR CHANGE: 0
NAUSEA: 0
EYE ITCHING: 0

## 2019-08-02 ASSESSMENT — PATIENT HEALTH QUESTIONNAIRE - PHQ9
SUM OF ALL RESPONSES TO PHQ QUESTIONS 1-9: 0
1. LITTLE INTEREST OR PLEASURE IN DOING THINGS: 0
2. FEELING DOWN, DEPRESSED OR HOPELESS: 0
SUM OF ALL RESPONSES TO PHQ9 QUESTIONS 1 & 2: 0
SUM OF ALL RESPONSES TO PHQ QUESTIONS 1-9: 0

## 2019-10-17 ENCOUNTER — OFFICE VISIT (OUTPATIENT)
Dept: ORTHOPEDIC SURGERY | Age: 44
End: 2019-10-17
Payer: COMMERCIAL

## 2019-10-17 VITALS
WEIGHT: 192 LBS | HEIGHT: 69 IN | DIASTOLIC BLOOD PRESSURE: 89 MMHG | SYSTOLIC BLOOD PRESSURE: 136 MMHG | BODY MASS INDEX: 28.44 KG/M2 | HEART RATE: 64 BPM

## 2019-10-17 DIAGNOSIS — S86.012D RUPTURE OF LEFT ACHILLES TENDON, SUBSEQUENT ENCOUNTER: Primary | ICD-10-CM

## 2019-10-17 PROCEDURE — G8419 CALC BMI OUT NRM PARAM NOF/U: HCPCS | Performed by: PHYSICIAN ASSISTANT

## 2019-10-17 PROCEDURE — G8427 DOCREV CUR MEDS BY ELIG CLIN: HCPCS | Performed by: PHYSICIAN ASSISTANT

## 2019-10-17 PROCEDURE — G8484 FLU IMMUNIZE NO ADMIN: HCPCS | Performed by: PHYSICIAN ASSISTANT

## 2019-10-17 PROCEDURE — 1036F TOBACCO NON-USER: CPT | Performed by: PHYSICIAN ASSISTANT

## 2019-10-17 PROCEDURE — 99213 OFFICE O/P EST LOW 20 MIN: CPT | Performed by: PHYSICIAN ASSISTANT

## 2019-10-17 PROCEDURE — 99213 OFFICE O/P EST LOW 20 MIN: CPT

## 2020-01-23 ENCOUNTER — HOSPITAL ENCOUNTER (EMERGENCY)
Age: 45
Discharge: HOME | End: 2020-01-23
Payer: COMMERCIAL

## 2020-01-23 ENCOUNTER — APPOINTMENT (OUTPATIENT)
Dept: GENERAL RADIOLOGY | Age: 45
End: 2020-01-23
Payer: COMMERCIAL

## 2020-01-23 VITALS
HEIGHT: 69 IN | TEMPERATURE: 99.3 F | RESPIRATION RATE: 16 BRPM | WEIGHT: 189 LBS | DIASTOLIC BLOOD PRESSURE: 80 MMHG | OXYGEN SATURATION: 98 % | SYSTOLIC BLOOD PRESSURE: 175 MMHG | BODY MASS INDEX: 27.99 KG/M2 | HEART RATE: 77 BPM

## 2020-01-23 LAB
INFLUENZA A BY PCR: DETECTED
INFLUENZA B BY PCR: NOT DETECTED

## 2020-01-23 PROCEDURE — 87502 INFLUENZA DNA AMP PROBE: CPT

## 2020-01-23 PROCEDURE — 71046 X-RAY EXAM CHEST 2 VIEWS: CPT

## 2020-01-23 PROCEDURE — 6370000000 HC RX 637 (ALT 250 FOR IP): Performed by: PHYSICIAN ASSISTANT

## 2020-01-23 PROCEDURE — 99283 EMERGENCY DEPT VISIT LOW MDM: CPT

## 2020-01-23 RX ORDER — IBUPROFEN 800 MG/1
800 TABLET ORAL EVERY 6 HOURS PRN
Qty: 20 TABLET | Refills: 3 | Status: SHIPPED | OUTPATIENT
Start: 2020-01-23 | End: 2021-08-06 | Stop reason: ALTCHOICE

## 2020-01-23 RX ORDER — IBUPROFEN 800 MG/1
800 TABLET ORAL ONCE
Status: COMPLETED | OUTPATIENT
Start: 2020-01-23 | End: 2020-01-23

## 2020-01-23 RX ADMIN — IBUPROFEN 800 MG: 800 TABLET, FILM COATED ORAL at 18:45

## 2020-01-23 ASSESSMENT — PAIN DESCRIPTION - PAIN TYPE: TYPE: ACUTE PAIN

## 2020-01-23 ASSESSMENT — PAIN SCALES - GENERAL
PAINLEVEL_OUTOF10: 8
PAINLEVEL_OUTOF10: 8

## 2020-01-23 ASSESSMENT — PAIN DESCRIPTION - LOCATION: LOCATION: GENERALIZED

## 2020-01-23 NOTE — ED PROVIDER NOTES
[facility]       Department of Emergency Medicine   ED  Provider Note  Admit Date/RoomTime: 1/23/2020  5:55 PM  ED Room: 29/29  Chief Complaint:   Generalized Body Aches (x 2 days ); Chills; and Cough    History of Present Illness   Source of history provided by:  patient. History/Exam Limitations: none. Federica Madrid is a 38 y.o. old male with a past medical history of:   Past Medical History:   Diagnosis Date    Closed fracture of left distal radius 7/2/2014    Essential hypertension 11/2/2017    Fracture of radial neck, right, closed 7/2/2014    presents to the emergency department by private vehicle, for fever, chills, cough and abdominal pain and thigh pain, which began 2 day(s) prior to arrival.  Since onset the symptoms have been persistent and moderate to severe in severity. The symptoms are associated with malaise and muscle aches. There has been NO chest pain, diarrhea, dizziness, dysuria, neck stiffness, rash, vomiting or wheezing. Patient reports his daughter who is 6years old also has similar illness for the last 2 days but has been doing better today. ROS   Pertinent positives and negatives are stated within HPI, all other systems reviewed and are negative. Past Surgical History:  has a past surgical history that includes Achilles tendon surgery (Left, 2/20/2019). Social History:  reports that he has never smoked. He has never used smokeless tobacco. He reports that he does not drink alcohol or use drugs. Family History: family history includes Diabetes in his father; High Blood Pressure in his mother. Allergies: Patient has no known allergies. Physical Exam           ED Triage Vitals [01/23/20 1753]   BP Temp Temp Source Pulse Resp SpO2 Height Weight   (!) 175/80 99.3 °F (37.4 °C) Oral 77 16 98 % 5' 9\" (1.753 m) 189 lb (85.7 kg)      Oxygen Saturation Interpretation: Normal.    · Constitutional:  Alert, development consistent with age.   · Ears:  External Ears: Bilateral normal.               TM's & External Canals: normal TMs bilaterally. · Nose:   There is no discharge, swelling or lesions noted. · Sinuses: no Bilateral maxillary sinus tenderness. no Bilateral frontal sinus tenderness. · Mouth:  normal tongue and buccal mucosa. · Throat: moderate erythema. Airway Patent. · Neck:  Supple. There is no  preauricular, submental, parotid, anterior cervical, posterior cervical and supraclavicular node tenderness. · Respiratory:   Breath sounds: Bilateral normal.  Lung sounds: normal.   · CV:  Regular rate and rhythm, normal heart sounds, without pathological murmurs, ectopy, gallops, or rubs. · GI:  Abdomen Soft, nontender, good bowel sounds. No firm or pulsatile mass. · Integument:  Normal turgor. Warm, dry, without visible rash. · Neurological:  Oriented. Motor functions intact. Lab / Imaging Results   (All laboratory and radiology results have been personally reviewed by myself)  Labs:  Results for orders placed or performed during the hospital encounter of 01/23/20   Rapid influenza A/B antigens   Result Value Ref Range    Influenza A by PCR DETECTED (A) Not Detected    Influenza B by PCR Not Detected Not Detected       Imaging: All Radiology results interpreted by Radiologist unless otherwise noted. XR CHEST STANDARD (2 VW)   Final Result   No acute cardiopulmonary findings. ED Course / Medical Decision Making     Medications   ibuprofen (ADVIL;MOTRIN) tablet 800 mg (800 mg Oral Given 1/23/20 1845)            Consults:   None    Procedures:   none    Medical Decision Making:    Based on moderate suspicion for pneumonia as per history/physical findings, imaging was done. Upper respiratory infection is likely to  be viral in etiology (flu A +). Antibiotics are not indicated at this time based on clinical presentation and physical findings. He is not hypoxic.   Patient is well appearing, non toxic and appropriate for

## 2020-01-24 NOTE — ED NOTES
Reviewed discharge instructions with patient, discussed medications and addressed all patient and family questions/concerns. Pt verbalizes understanding.        Prashant Hair RN  01/23/20 0288

## 2021-08-06 ENCOUNTER — HOSPITAL ENCOUNTER (EMERGENCY)
Age: 46
Discharge: HOME | End: 2021-08-06
Payer: COMMERCIAL

## 2021-08-06 ENCOUNTER — APPOINTMENT (OUTPATIENT)
Dept: GENERAL RADIOLOGY | Age: 46
End: 2021-08-06
Payer: COMMERCIAL

## 2021-08-06 VITALS
WEIGHT: 201 LBS | RESPIRATION RATE: 16 BRPM | HEART RATE: 87 BPM | BODY MASS INDEX: 29.77 KG/M2 | HEIGHT: 69 IN | TEMPERATURE: 95.6 F | SYSTOLIC BLOOD PRESSURE: 148 MMHG | OXYGEN SATURATION: 97 % | DIASTOLIC BLOOD PRESSURE: 95 MMHG

## 2021-08-06 DIAGNOSIS — S62.305A CLOSED FRACTURE OF FOURTH METACARPAL BONE OF LEFT HAND, UNSPECIFIED FRACTURE MORPHOLOGY, INITIAL ENCOUNTER: Primary | ICD-10-CM

## 2021-08-06 PROCEDURE — 29125 APPL SHORT ARM SPLINT STATIC: CPT

## 2021-08-06 PROCEDURE — 73090 X-RAY EXAM OF FOREARM: CPT

## 2021-08-06 PROCEDURE — 99283 EMERGENCY DEPT VISIT LOW MDM: CPT

## 2021-08-06 PROCEDURE — 73130 X-RAY EXAM OF HAND: CPT

## 2021-08-06 RX ORDER — IBUPROFEN 800 MG/1
800 TABLET ORAL EVERY 8 HOURS PRN
Qty: 21 TABLET | Refills: 0 | Status: SHIPPED | OUTPATIENT
Start: 2021-08-06 | End: 2021-09-03

## 2021-08-06 RX ORDER — HYDROCODONE BITARTRATE AND ACETAMINOPHEN 5; 325 MG/1; MG/1
1 TABLET ORAL EVERY 8 HOURS PRN
Qty: 9 TABLET | Refills: 0 | Status: SHIPPED | OUTPATIENT
Start: 2021-08-06 | End: 2021-08-09

## 2021-08-06 ASSESSMENT — PAIN DESCRIPTION - LOCATION: LOCATION: HAND

## 2021-08-06 ASSESSMENT — PAIN SCALES - GENERAL: PAINLEVEL_OUTOF10: 6

## 2021-08-06 ASSESSMENT — PAIN DESCRIPTION - ORIENTATION: ORIENTATION: LEFT

## 2021-08-06 ASSESSMENT — PAIN DESCRIPTION - PAIN TYPE: TYPE: ACUTE PAIN

## 2021-08-06 NOTE — ED NOTES
FIRST PROVIDER CONTACT ASSESSMENT NOTE                                                                                                Department of Emergency Medicine                                                      First Provider Note  21  2:22 PM EDT  NAME: Samia Martell  : 1975  MRN: [id number]    Chief Complaint: Hand Injury (smashed at work. )      History of Present Illness:   Samia Martell is a 51 y.o. male who presents to the ED for left hand injury. Focused Physical Exam:  VS:    ED Triage Vitals   BP Temp Temp Source Pulse Resp SpO2 Height Weight   21 1419 21 1400 21 1400 21 1400 21 1419 21 1400 21 1419 21 1419   (!) 148/95 95.6 °F (35.3 °C) Infrared 87 16 97 % 5' 9\" (1.753 m) 201 lb (91.2 kg)        General: Alert and in no apparent distress. Medical History:  has a past medical history of Closed fracture of left distal radius, Essential hypertension, and Fracture of radial neck, right, closed. Surgical History:  has a past surgical history that includes Achilles tendon surgery (Left, 2019). Social History:  reports that he has never smoked. He has never used smokeless tobacco. He reports that he does not drink alcohol and does not use drugs. Family History: family history includes Diabetes in his father; High Blood Pressure in his mother. Allergies: Patient has no known allergies.      Initial Plan of Care:  Initiate Treatment-Testing, Proceed toTreatment Area When Bed Available for ED Attending/MLP to Continue Care    -------------------------------------------------END OF FIRST PROVIDER CONTACT ASSESSMENT NOTE--------------------------------------------------------  Electronically signed by TIMOTHY Allen   DD: 21       TIMOTHY Allen  21 142

## 2021-08-08 NOTE — ED PROVIDER NOTES
[facility]  Department of Emergency Medicine   ED  Encounter Note  Admit Date/RoomTime: 2021  2:36 PM  ED Room: [facility]    NAME: Junior Bridges  : 1975  MRN: [id number]     Chief Complaint:  Hand Injury (smashed at work. )    History of Present Illness       Juliana Berkowitz is a 51 y.o. old male presenting to the emergency department by private vehicle, for persistent left hand and left arm pain after injury at work today. Patient states he smashed between a dresser drawer. Patient states his symptoms are mild in severity and describes as an aching pain. Patient denies anything making it better or worse. Patient denies previous injury. Denies fever/chills, headache, vision change, dizziness, chest pain, dyspnea, abdominal pain, NVD, numbness/weakness. ROS   Pertinent positives and negatives are stated within HPI, all other systems reviewed and are negative. Past Medical History:  has a past medical history of Closed fracture of left distal radius, Essential hypertension, and Fracture of radial neck, right, closed. Surgical History:  has a past surgical history that includes Achilles tendon surgery (Left, 2019). Social History:  reports that he has never smoked. He has never used smokeless tobacco. He reports that he does not drink alcohol and does not use drugs. Family History: family history includes Diabetes in his father; High Blood Pressure in his mother. Allergies: Patient has no known allergies. Physical Exam   Oxygen Saturation Interpretation: Normal.        ED Triage Vitals   BP Temp Temp Source Pulse Resp SpO2 Height Weight   21 1419 21 1400 21 1400 21 1400 21 1419 21 1400 21 1419 21 1419   (!) 148/95 95.6 °F (35.3 °C) Infrared 87 16 97 % 5' 9\" (1.753 m) 201 lb (91.2 kg)         Constitutional:  Alert, development consistent with age. Neck:  Normal ROM. Supple. Non-tender. Hand: Left dorsal over 4th and 5th metacarpal            Tenderness: mild. Swelling: Mild. Deformity: no.               Skin:  no wounds, erythema, or swelling. Neurovascular: Motor deficit: none. Sensory deficit:   none. Pulse deficit: none. Capillary refill: normal.  Fingers:  all            Tenderness:  none. Swelling: None. Deformity: no.              ROM: full range with pain. Able to fully flex and extend digits. Skin:  no wounds, erythema, or swelling. Forearm:              Tenderness:  mild. Swelling: None. Deformity: no.             ROM: full range of motion. Skin: no wounds, erythema, or swelling. Lymphatics: No lymphangitis or adenopathy noted. Neurological:  Oriented. Motor functions intact. t. Lab / Imaging Results   (All laboratory and radiology results have been personally reviewed by myself)  Labs:  No results found for this visit on 08/06/21. Imaging: All Radiology results interpreted by Radiologist unless otherwise noted. XR RADIUS ULNA LEFT (2 VIEWS)   Final Result   1. No acute osseous findings about the left forearm. Normal alignment. 2.  Partially imaged fracture of the distal aspect the left ring finger   metacarpal.         XR HAND LEFT (MIN 3 VIEWS)   Final Result   1. Acute oblique fracture through the distal metaphysis of the left 4th   metacarpal bone, with minimal, apex-dorsal angulation and minimal override,   surrounded by mild soft tissue swelling. 2.   Remote fracture deformity also involves the left 5th metacarpal bone,   with similar angulated deformity. 3.  Osseous degenerative disease, as described. .      RECOMMENDATION:      1. Consider post-treatment follow-up imaging, as directed clinically.       .           ED Course / Medical Decision Making   Medications - No data to display    Consult(s):   None    Procedure(s):   none    MDM: Patient presenting with hand pain. Patient is in no acute distress, afebrile, nontoxic appearance. Patient's radius and ulna showed no acute findings and the left hand showed an oblique fracture through the distal metaphysis of the left fourth metacarpal bone and remote fracture deformity of the left fifth metacarpal bone. Patient placed in splint and recommend follow-up with orthopedics Monday. Patient be sent with pain medication. Discussed supportive care with patient. Recommend patient return to the ED with new or worsening of symptoms. Plan of Care/Counseling:  ELIZ Alfaro reviewed today's visit with the patient in addition to providing specific details for the plan of care and counseling regarding the diagnosis and prognosis. Questions are answered at this time and are agreeable with the plan. Assessment      1. Closed fracture of fourth metacarpal bone of left hand, unspecified fracture morphology, initial encounter      Plan   Discharged home. Patient condition is stable    New Medications     Discharge Medication List as of 8/6/2021  4:27 PM      START taking these medications    Details   ibuprofen (IBU) 800 MG tablet Take 1 tablet by mouth every 8 hours as needed for Pain, Disp-21 tablet, R-0Print      HYDROcodone-acetaminophen (NORCO) 5-325 MG per tablet Take 1 tablet by mouth every 8 hours as needed for Pain for up to 3 days. Intended supply: 3 days. Take lowest dose possible to manage pain, Disp-9 tablet, R-0Print           Electronically signed by ELIZ Alfaro   DD: 8/8/21  **This report was transcribed using voice recognition software. Every effort was made to ensure accuracy; however, inadvertent computerized transcription errors may be present.   END OF ED PROVIDER NOTE     ELIZ Alfaro  08/08/21 3602

## 2021-08-09 ENCOUNTER — TELEPHONE (OUTPATIENT)
Dept: ORTHOPEDIC SURGERY | Age: 46
End: 2021-08-09

## 2021-08-09 NOTE — TELEPHONE ENCOUNTER
Per ED note this injury occurred at work. Please confirm whether or not this is going to be a C claim. Okay to schedule patient this Friday.

## 2021-08-09 NOTE — TELEPHONE ENCOUNTER
All Radiology results interpreted by Radiologist unless otherwise noted. XR RADIUS ULNA LEFT (2 VIEWS)   Final Result   1. No acute osseous findings about the left forearm. Normal alignment.       2.  Partially imaged fracture of the distal aspect the left ring finger   metacarpal.           XR HAND LEFT (MIN 3 VIEWS)   Final Result   1. Acute oblique fracture through the distal metaphysis of the left 4th   metacarpal bone, with minimal, apex-dorsal angulation and minimal override,   surrounded by mild soft tissue swelling.       2.   Remote fracture deformity also involves the left 5th metacarpal bone,   with similar angulated deformity.       3.  Osseous degenerative disease, as described. Routing to providers for scheduling recommendation.

## 2021-08-09 NOTE — TELEPHONE ENCOUNTER
Pt called in to schedule a ED F/U for a fracture of the LT hand. Juliana can be reached at [phone]. Thank you.

## 2021-08-10 DIAGNOSIS — M79.642 PAIN OF LEFT HAND: Primary | ICD-10-CM

## 2021-08-13 ENCOUNTER — OFFICE VISIT (OUTPATIENT)
Dept: ORTHOPEDIC SURGERY | Age: 46
End: 2021-08-13
Payer: COMMERCIAL

## 2021-08-13 ENCOUNTER — HOSPITAL ENCOUNTER (OUTPATIENT)
Dept: GENERAL RADIOLOGY | Age: 46
Discharge: HOME | End: 2021-08-15
Payer: COMMERCIAL

## 2021-08-13 VITALS — TEMPERATURE: 97.7 F

## 2021-08-13 DIAGNOSIS — S62.365A CLOSED NONDISPLACED FRACTURE OF NECK OF FOURTH METACARPAL BONE OF LEFT HAND, INITIAL ENCOUNTER: Primary | ICD-10-CM

## 2021-08-13 DIAGNOSIS — M79.642 PAIN OF LEFT HAND: ICD-10-CM

## 2021-08-13 PROCEDURE — G8427 DOCREV CUR MEDS BY ELIG CLIN: HCPCS | Performed by: PHYSICIAN ASSISTANT

## 2021-08-13 PROCEDURE — 73130 X-RAY EXAM OF HAND: CPT

## 2021-08-13 PROCEDURE — 26600 TREAT METACARPAL FRACTURE: CPT | Performed by: PHYSICIAN ASSISTANT

## 2021-08-13 PROCEDURE — 99214 OFFICE O/P EST MOD 30 MIN: CPT | Performed by: PHYSICIAN ASSISTANT

## 2021-08-13 PROCEDURE — G8419 CALC BMI OUT NRM PARAM NOF/U: HCPCS | Performed by: PHYSICIAN ASSISTANT

## 2021-08-13 PROCEDURE — 99202 OFFICE O/P NEW SF 15 MIN: CPT | Performed by: PHYSICIAN ASSISTANT

## 2021-08-13 PROCEDURE — 1036F TOBACCO NON-USER: CPT | Performed by: PHYSICIAN ASSISTANT

## 2021-08-13 NOTE — PATIENT INSTRUCTIONS
Weightbearing: Non-weight bearing -through left wrist and hand    OK to continue with Tylenol/Ibuprofen as needed for pain  Continue ice and elevation of the left hand to keep swelling controlled    Continue: To work on range of motion of the fingers that are free in splint    Please maintain splint to surgical extremity, do not remove or alter without contacting office. Please keep clean and dry until follow up in office. If this becomes too tight, too loose, wet or damaged please contact Ortho Trauma Office. Follow up: In 2-3 weeks with x-rays     Please call the office at [phone] or send [employer] message to providers sooner with any questions or concerns  Strongly recommend all of our patients sign up for [employer] in order to have direct communication VIA [employer] ANNY with our clinic staff.

## 2021-08-13 NOTE — PROGRESS NOTES
Orthopaedic Clinic Note     S: Junior Bridges is a right-hand-dominant 51 y.o. who is here today for ED follow-up for metacarpal neck fracture of the left ring finger. Patient states he smashed his hand at work and had left hand pain, went to the ED and was diagnosed with a left ring metacarpal neck fracture. Splint was applied and he was instructed to follow-up with orthopedics. Today his images show no interval displacement of his fracture. He states his pain is better and has no other concerns or orthopedic complaints today. Denies numbness, tingling, weakness. Patient Active Problem List   Diagnosis    Essential hypertension    Rupture of left Achilles tendon    Closed nondisplaced fracture of neck of fourth metacarpal bone of left hand       Physical Exam    Temp 97.7 °F (36.5 °C)     O: Alert and oriented X 3, no acute distress, respirations easy and unlabored with no audible wheezes, skin warm and dry, speech and dress appropriate for noted age, affect euthymic. Left hand:    · Skin intact circumferentially  · Patient had a Ortho-Glass splint, this was taken down for exam  · Mild tenderness to palpation over the metacarpal head of the ring finger  · good range of motion in all fingers  · +AIN/PIN/Ulnar nerve function intact grossly  · +Radial pulse, Brisk Cap refill, hand warm and perfused  · Sensation intact to touch in radial/ulnar/median nerve distributions to hand      Xrays Reviewed  3 views of the left hand demonstrating a mildly displaced fracture of the metacarpal neck of the ring finger. It appears to be extra-articular, when compared with previous x-rays there is no interval displacement. Approximately 25 of angulation.     A:     ICD-10-CM    1. Closed nondisplaced fracture of neck of fourth metacarpal bone of left hand, initial encounter  S62.365A        P: Ulnar gutter splint applied in office  Nonweightbearing to the left upper extremity  Follow-up in 4 weeks for repeat x-rays    Electronically Signed By  Eri Lutz DO    NOTE: This report was transcribed using voice recognition software.  Every effort was made to ensure accuracy; however, inadvertent computerized transcription errors may be present

## 2021-08-16 NOTE — PROGRESS NOTES
Kianna De Anda is a 51 y.o. male, his YOB: 1975 with the following history as recorded in [facility]:      Patient Active Problem List    Diagnosis Date Noted    Closed nondisplaced fracture of neck of fourth metacarpal bone of left hand 08/13/2021    Rupture of left Achilles tendon 02/20/2019    Essential hypertension 11/02/2017     Current Outpatient Medications   Medication Sig Dispense Refill    ibuprofen (IBU) 800 MG tablet Take 1 tablet by mouth every 8 hours as needed for Pain 21 tablet 0     No current facility-administered medications for this visit. Allergies: Patient has no known allergies. Past Medical History:   Diagnosis Date    Closed fracture of left distal radius 7/2/2014    Essential hypertension 11/2/2017    Fracture of radial neck, right, closed 7/2/2014     Past Surgical History:   Procedure Laterality Date    ACHILLES TENDON SURGERY Left 2/20/2019    LEFT ACHILLES TENDON REPAIR performed by Danni Corea DO at [facility] OR     Family History   Problem Relation Age of Onset    High Blood Pressure Mother     Diabetes Father      Social History     Tobacco Use    Smoking status: Never Smoker    Smokeless tobacco: Never Used   Substance Use Topics    Alcohol use: No                             Chief Complaint   Patient presents with    Follow-Up from Hospital     left hand fracture seen in ED on 8/6       SUBJECTIVE: Kianna De Anda is here for initial evaluation for their left hand fracture. States that they had injured it after sustaining a crushing injury to the hand, reports smashed between a dresser drawer. DOI: 8/6/21 Was seen in ER and XRs revealed a minimally displaced 4th metacarpal fracture. They were placed in a well padded ulnar gutter splint and referred here. Denies any other injuries, has had 5th metacarpal fracture on this hand. Patient is left hand dominant. Patient is established with Dr. Larissa Zaldivar after left achilles repair in 2019.  Denies any numbness or tingling. Pain tolerable currently with medications. Review of Systems -   General ROS: negative for - chills, fatigue, fever or night sweats  Respiratory ROS: no cough, shortness of breath, or wheezing  Cardiovascular ROS: no chest pain or dyspnea on exertion, palpatations  Gastrointestinal ROS: no abdominal pain, diarrhea, constipation, nausea or vomiting, no blood in stool  Genitourinary ROS: Negative for hematuria and difficulty urinating. Musculoskeletal ROS:See HPI  Neurological ROS: Negative for numbness and tingling to LUE    Physical Examination:   Alert and oriented X 3, no acute distress, normocephalic, atraumatic, external eyes and ears normal. Abdomen not distended. Respirations easy and unlabored with no audible wheezes, skin warm and dry, speech and dress appropriate for noted age, affect euthymic. Musculoskeletal:    Extremity:  left Upper Extremity  ·  Skin intact circumferentially  · Mildly sunken appearance of 4th and 5th MCPs  · +TTP at 4th metacarpal distally and at MCP  · Compartments soft and compressible  · +AIN/PIN/Ulnar nerve function intact grossly  · Appropriate digital cascade without malrotation noted  · +Radial pulse, Brisk Cap refill, hand warm and perfused  · Sensation intact to touch in radial/ulnar/median nerve distributions to hand      Temp 97.7 °F (36.5 °C)      XR: 3V of the left hand demonstrates minimally displaced 4th metacarpal neck fracture, sequela of prior 5th metacarpal fracture noted. No other acute osseous abnormality    ASSESSMENT:     Diagnosis Orders   1. Closed nondisplaced fracture of neck of fourth metacarpal bone of left hand, initial encounter         Discussion:  Had lengthy discussion with patient regarding His diagnosis, typical prognosis, and expected outcomes. I reviewed the possible complications from the injury itself despite treatment choosen.  I also discussed treatment options including nonoperative managements versus surgical management, along with risks and benefits of each. They have elected for nonoperative management at this time. Discussed with patient factors that can impact patient's fracture healing. Patient has the following risk factors for union: none    PLAN:  Patient is placed in a well padded ulnar gutter splint, instructed on splint care    Non-weight bearing -through left wrist and hand    OK to continue with Tylenol/Ibuprofen as needed for pain  Continue ice and elevation of the left hand to keep swelling controlled    Continue: To work on range of motion of the fingers that are free in splint  Follow up 3 weeks for repeat xrays out of plaster      Electronically signed by Tramaine Ayoub PA-C on 8/16/2021 at 5:32 PM  Note: This report was completed using computerize voiced recognition software. Every effort has been made to ensure accuracy; however, inadvertent computerized transcription errors may be present.

## 2021-09-03 ENCOUNTER — HOSPITAL ENCOUNTER (OUTPATIENT)
Dept: GENERAL RADIOLOGY | Age: 46
Discharge: HOME | End: 2021-09-05
Payer: COMMERCIAL

## 2021-09-03 ENCOUNTER — OFFICE VISIT (OUTPATIENT)
Dept: ORTHOPEDIC SURGERY | Age: 46
End: 2021-09-03
Payer: COMMERCIAL

## 2021-09-03 VITALS — TEMPERATURE: 97.3 F

## 2021-09-03 DIAGNOSIS — S62.365A CLOSED NONDISPLACED FRACTURE OF NECK OF FOURTH METACARPAL BONE OF LEFT HAND, INITIAL ENCOUNTER: ICD-10-CM

## 2021-09-03 DIAGNOSIS — S62.365A CLOSED NONDISPLACED FRACTURE OF NECK OF FOURTH METACARPAL BONE OF LEFT HAND, INITIAL ENCOUNTER: Primary | ICD-10-CM

## 2021-09-03 PROCEDURE — 73130 X-RAY EXAM OF HAND: CPT

## 2021-09-03 PROCEDURE — 29125 APPL SHORT ARM SPLINT STATIC: CPT | Performed by: PHYSICIAN ASSISTANT

## 2021-09-03 PROCEDURE — 99024 POSTOP FOLLOW-UP VISIT: CPT | Performed by: PHYSICIAN ASSISTANT

## 2021-09-03 PROCEDURE — 99212 OFFICE O/P EST SF 10 MIN: CPT | Performed by: PHYSICIAN ASSISTANT

## 2021-09-03 NOTE — PATIENT INSTRUCTIONS
Non weightbearing left upper extremity  Please keep splint on except to shower, keep splint clean and dry  Take ibuprofen or tylenol as needed for pain  Please return to the clinic in 3 weeks for follow up  Please call the clinic if questions or concerns arise before then

## 2021-09-03 NOTE — PROGRESS NOTES
Orthopaedic Clinic Note     S: Nancy Lopez is a right-hand-dominant 51 y.o. who is here today for follow-up for metacarpal neck fracture of the left ring finger. Patient states he has been doing well and is currently having no pain. He has not been using any medications for the pain. Denies numbness, tingling, weakness. Currently denies any other orthopedic complaints. Patient Active Problem List   Diagnosis    Essential hypertension    Rupture of left Achilles tendon    Closed nondisplaced fracture of neck of fourth metacarpal bone of left hand       Physical Exam    Temp 97.3 °F (36.3 °C) (Oral)     O: Alert and oriented X 3, no acute distress, respirations easy and unlabored with no audible wheezes, skin warm and dry, speech and dress appropriate for noted age, affect euthymic. Left hand:    · Skin intact circumferentially  · Patient had a plaster splint, this was taken down for exam  · No tenderness to palpation on exam  · good range of motion in all fingers, flexion cascade aligned properly. · +AIN/PIN/Ulnar nerve function intact grossly  · +Radial pulse, Brisk Cap refill, hand warm and perfused  · Sensation intact to touch in radial/ulnar/median nerve distributions to hand      Xrays Reviewed  3 views of the left hand demonstrating a well aligned 4th metacarpal neck fracture wth interval healing since last exam.      A:   Left closed displaced fracture of fourth metacarpal neck, subsequent encounter    P: removable  Ulnar gutter splint applied in office  Nonweightbearing to the left upper extremity  Follow-up in 3 weeks for repeat x-rays  Ibuprofen and tylenol as needed for pain      Electronically Signed By  Chuckie Hitchcock DO    NOTE: This report was transcribed using voice recognition software.  Every effort was made to ensure accuracy; however, inadvertent computerized transcription errors may be present    I have seen and evaluated the patient with the resident physician and agree with the above assessments on today's visit. I have performed the key components of the history and physical examination and concur completely with the findings and plans as documented above. My personal evaluation and documentation is as below:      SUBJECTIVE: Brielle Julien is a 51 y.o.  male who presents for above CC - agree with HPI as stated above    ROS: All pertinent review of systems are as listed above in HPI and and resident documentation    OBJECTIVE:   Alert and oriented X 3, no acute distress, respirations easy and unlabored with no audible wheezes, skin warm and dry, speech and dress appropriate for noted age, affect euthymic. Extremity:  Agree with resident assessment above    XR: agree with above XR interpretation     ASSESSMENT:   Diagnosis Orders   1. Closed nondisplaced fracture of neck of fourth metacarpal bone of left hand, initial encounter         PLAN:  Agree with plan of care as stated above. Removable ulnar gutter splint applied today to be worn at all times except for hygiene purposes, NWB affected extremity. F/u 3 weeks with repeat XR in office     Electronically signed by Maude Merlin, PA-C on 9/3/2021 at 1:25 PM  Note: This report was completed using [employer] voiced recognition software. Every effort has been made to ensure accuracy; however, inadvertent computerized transcription errors may be present.

## 2021-09-24 ENCOUNTER — OFFICE VISIT (OUTPATIENT)
Dept: ORTHOPEDIC SURGERY | Age: 46
End: 2021-09-24
Payer: COMMERCIAL

## 2021-09-24 ENCOUNTER — HOSPITAL ENCOUNTER (OUTPATIENT)
Dept: GENERAL RADIOLOGY | Age: 46
Discharge: HOME | End: 2021-09-26
Payer: COMMERCIAL

## 2021-09-24 VITALS — TEMPERATURE: 97.8 F

## 2021-09-24 DIAGNOSIS — S62.365A CLOSED NONDISPLACED FRACTURE OF NECK OF FOURTH METACARPAL BONE OF LEFT HAND, INITIAL ENCOUNTER: ICD-10-CM

## 2021-09-24 DIAGNOSIS — S62.365D CLOSED NONDISPLACED FRACTURE OF NECK OF FOURTH METACARPAL BONE OF LEFT HAND WITH ROUTINE HEALING, SUBSEQUENT ENCOUNTER: Primary | ICD-10-CM

## 2021-09-24 PROCEDURE — 99024 POSTOP FOLLOW-UP VISIT: CPT | Performed by: PHYSICIAN ASSISTANT

## 2021-09-24 PROCEDURE — 73130 X-RAY EXAM OF HAND: CPT

## 2021-09-24 PROCEDURE — L3809 WHFO W/O JOINTS PRE OTS: HCPCS | Performed by: PHYSICIAN ASSISTANT

## 2021-09-24 PROCEDURE — 99212 OFFICE O/P EST SF 10 MIN: CPT | Performed by: PHYSICIAN ASSISTANT

## 2021-09-24 NOTE — PROGRESS NOTES
Lydia Zacarias is a 51 y.o. male who presents for follow up of left hand    Date of Injury/Surgery: 8/6/2021  Date last seen in office: 9/3/2021    Symptoms: better  New complaints: patient has soreness, stiffness and aching     Cast/Splint, Brace, or Dressings: Clean, dry and intact, Well fitting and Taken down for visit    Weightbearing: left upper Non-weight bearing      Assistive device No Device  Participating in therapy (location if yes)?  no    Refills Needed: None  Order/Referral Needed: N/A

## 2021-09-24 NOTE — PROGRESS NOTES
Chief Complaint   Patient presents with    Hand Pain     L hand 4th digit       SUBJECTIVE: Kory Grande is an 51 y.o. male who presents to the office for follow up on left 4th metacarpal neck fracture from 8/6/21. Patient is LHD. Patient has been wearing a removable ulnar gutter splint, removing for hygiene. Not participating in formal therapy, not yet returned to work. Is now 6 weeks from initial DOI. Patient states pain improved, ROM still stiff. No new N/T/weakness, no new areas of concern. Review of Systems -   General ROS: negative for - chills, fatigue, fever or night sweats  Respiratory ROS: no cough, shortness of breath, or wheezing  Cardiovascular ROS: no chest pain or dyspnea on exertion  Gastrointestinal ROS: no abdominal pain, change in bowel habits, or black or bloody stools  Genitourinary: no hematuria, dysuria, or incontinence   Musculoskeletal ROS:see above  Neurological ROS: no TIA or stroke symptoms       OBJECTIVE:   Alert and oriented X 3, no acute distress, respirations easy and unlabored with no audible wheezes, skin warm and dry, speech and dress appropriate for noted age, affect euthymic. Extremity:  Left Upper Extremity  Skin is clean dry and intact   No evidence of skin breakdown or discoloration  no edema noted  Radial pulse palpable, fingers warm with BCR  Flex/extension intact to wrist, thumb and fingers   Finger opposition intact  Finger adduction/abduction intact  Finger crossover intact  Unable to make concentric fist, limited secondary to ring and little fingers  Subjectively states sensation intact to Median Nerve, Ulnar Nerve and Radial Nerve distribution  No TTP at the 4th metacarpal or MCP joint    XR: 9/24/21 3V of the left hand: Progressive fracture healing at the 4th metacarpal.  Old deformity of the 5th metacarpal as before. No new abnormal findings. Temp 97.8 °F (36.6 °C) (Oral)     ASSESSMENT:     Diagnosis Orders   1.  Closed nondisplaced fracture of

## 2021-09-24 NOTE — PATIENT INSTRUCTIONS
Good interval healing seen on x-rays  Start outpatient therapy to work on range of motion and strengthening  Wrist brace for support, kiana taping for additional support to but without immobilization of your knuckles  No more than 2 to 3 pounds of resistance with left hand, okay to advance as pain allows    Plan for follow-up in 3 to 4 weeks for repeat x-rays, depending on your range of motion and your strength will release to return to work at that time

## 2021-10-05 ENCOUNTER — HOSPITAL ENCOUNTER (OUTPATIENT)
Dept: OCCUPATIONAL THERAPY | Age: 46
Setting detail: THERAPIES SERIES
Discharge: HOME | End: 2021-10-05
Payer: COMMERCIAL

## 2021-10-05 PROCEDURE — 97165 OT EVAL LOW COMPLEX 30 MIN: CPT

## 2021-10-05 PROCEDURE — 97530 THERAPEUTIC ACTIVITIES: CPT

## 2021-10-05 NOTE — PROGRESS NOTES
OCCUPATIONAL THERAPY INITIAL EVALUATION    [address] THERAPY  [facility] [address]  Dept: [phone]  Loc: [address] [place] [address] OT fax [phone]    Date:  10/5/2021  Initial Evaluation Date: 10/05/2021   Evaluating Therapist: Liborio Story OT    Patient Name:  Wandy Bull    :  1975    Restrictions/Precautions: per MCP fracture protocol, low fall risk  Diagnosis:  Left 4th MCP fracture -- S62.365D (ICD-10-CM) - Closed nondisplaced fracture of neck of fourth metacarpal bone of left hand with routine healing, subsequent encounter  Date of Surgery/Injury: 2021 (inj)    Insurance/Certification information: [facility]  Plan of care signed (Y/N): N  Visit# / total visits: 1 / 10-    Referring Practitioner:  Kia Hill PA-C  Specific Practitioner Orders: ROM, Strengthening,  strength, modalities and manual therapy as needed. HEP    Assessment of current deficits   [] Functional mobility  [] ADLs  [x] Strength   [] Cognition   [] Functional transfers   [x] IADLs  [] Safety Awareness  [x] Endurance   [x] Fine Motor Coordination  [] Balance  [] Vision/perception  [] Sensation    [] Gross Motor Coordination [x] ROM  [x] Pain   [] Edema    [] Scar Adhesion/Skin Integrity     OT PLAN OF CARE   OT POC based on physician orders, patient diagnosis and results of clinical assessment.     Frequency/Duration: 1-2x/week for 6 weeks  /  Certification Period From: 10/05/2021  To: 2021    Specific OT Treatment to include:   [x] Instruction in HEP                   Modalities:  [x] Therapeutic Exercise        [x] Ultrasound               [] Electrical Stimulation/Attended  [x] PROM/Stretching                    [x] Fluidotherapy          [x]  Paraffin                   [x] AAROM  [x] AROM                 [] Iontophoresis:   [x] Tendon Glides                                               [] Neuromuscular Re-Ed             [] ADL/IADL re-training    [x] Therapeutic Activity       [x] Pain Management with/without modalities PRN                 [x] Manual Therapy                      [x] Splinting                      [] Scar Management                   []Joint Protection/Training  []Ergonomics                             [x] Joint Mobilization        [] Adaptive Equipment Assessment/Training                             [x] Manual Edema Mobilization   [x] Myofascial Release                 [x] Energy Conservation/Work Simplification  [x] GM/FM Coordination       [x] Safety retraining/education per  individual diagnosis/goals  [] Desensitization       Patient Specific Goal: Pt stated goal is to be able to regain full use of L hand                             GOALS (Long term same as Short term):  1.) Patient will demonstrate good understanding of home program (exercises/activities/diagnosis/prognosis/goals) with good accuracy. 2.) Patient will demonstrate increased active/passive range of motion of their L 4th MCP and DIP to [facility] for ADL/IADL completion. 3.) Patient will demonstrate increased  strength of at least 20-30  pounds of their left hand. 4.) Patient to report 100% compliance with their splint wear, care, and precautions if needed. 5.) Patient will decrease QuickDASH score to 20% or less for increased participation in daily functional activities.      Past Medical History:   Past Medical History:   Diagnosis Date    Closed fracture of left distal radius 7/2/2014    Essential hypertension 11/2/2017    Fracture of radial neck, right, closed 7/2/2014     Past Surgical History:   Past Surgical History:   Procedure Laterality Date    ACHILLES TENDON SURGERY Left 2/20/2019    LEFT ACHILLES TENDON REPAIR performed by Sharyn Rivera DO at [address] [place] [address]       Reason for Referral: Pt is a pleasant 51year old male presenting to outpatient occupational therapy s/p a fracture to the neck of his left 4th MCP after a dresser fell on his hand while working on 08/06/2021. Pt was treated conservatively with ulnar gutter splint and referred to and orthopedic specialist. Recent x-rays identify good healing. Pt was provided with a pre-fabricated wrist cock-up splint on 8/16/21. CC's are limited ROM in the RF and decreased functional ability. Pt referred to OT for ROM, Strengthening,  strength, modalities and manual therapy as needed. Pt presents today for OT evaluation and treatment at 8 weeks s/p inj. 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 Slight depression of 4th MCP along dorsal side was noted. Weakness in  strength of left hand is noted with results shown below. Pt presented good+ [facility] as demonstrated by his use of L hand to write. Some mildly dense scar tissue noted around head of left 4th MCP. No visible scarring present around site of injury. Pt suffered a burn injury along the left dorsal web space area from boiling water about a week ago. Wound is not draining, healing well with no indications of infection. Pt presented with a pre-fabricated wrist cock-up splint. Pt would benefit from skilled occupational therapy to address weakness, decreased ROM, and overall function of L hand.       Upper Extremity ROM /  KEY: Ext/Flex     AROM(PROM)   L 4th digit MCP E/F: 0/90*-100* 0/75      PIP E/F: 0/100* 0/95      DIP E/F: 0/70*-90* 0/65         Comment: Hand dominance is left    Dynamometer (setting 2):     Left: 54#      Right: 110#    Pinch Meter:   Lateral: Left= 24#, Right= 26#    Palmar 3 point: Left= 20#, Right=20#   9 Hole Peg Test:   Left: 21.4sec   Right: 21sec    QuickDASH Score: 27.3% disability    Intervention:   -Provided pt with MHP and pt was educated on use of heat and ice to address pain and stiffness.  -Provided HEP program through [address] [place] [address]: Tendon glides, PIP blocking, DIP blocking, finger extension, wrist flexion/extension, radial deviation/ulnar deviation [id number] each twice a day  -Wrist cock-up splint protocol and use reviewed and adjustments made. Pt to begin weaning to wearing during resistive activities only. Eval Complexity: Low Complexity  Profile and History- Brief review of chart and history  Assessment of Occupational Performance and Identification of Deficits- 6 deficits identified  Clinical Decision Making- None required    Rehab Potential:                                 [x] Good  [] Fair  [] Poor        Suggested Professional Referral:       [x] No  [] Yes:  Barriers to Goal Achievement[de-identified]           [x] No  [] Yes:  Domestic Concerns:                           [x] No  [] Yes:       Patient. Education:  [x] Plans/Goals, Risks/Benefits discussed  [x] Home exercise program  Method of Education: [x] Verbal  [x] Demo  [x] Written  Comprehension of Education:  [x] Verbalizes understanding. [x] Demonstrates understanding. [x] Needs Review. [] Demonstrates/verbalizes understanding of HEP/Ed previously given.         Patient understands diagnosis/prognosis and consents to treatment, plan and goals: [x] Yes    [] No   Goal Formulation: Patient    Time In: 1:00            Time Out: 2:00                      Timed Code Treatment Minutes: 25 minutes    CODE  Minutes  Units   81916 OT Eval Low 35 1   72519 OT Eval Medium     86919 OT Eval High     [id number] Fluidotherapy     97771 [facility] Manual     [phone] Therapeutic Ex      Therapeutic Activity 25 2   25937 ADL/COMP Tech Train     [phone] Neuromuscular Re-Ed     [id number] OrthoManagementTraining     [id number] Paraffin     51673 Electrical Stim - Attended     17104 Iontophoresis     18035 Ultrasound      Other               Electronically signed by: JUAN Monaco/L, [id number]     Physician's Certification / Comments      Frequency/Duration 1-2 / week for 10-12 visits. Certification Period From: 10/05/2021  To: 01/05/2021     By co-signing as the referring physician, I have reviewed the Plan of Care established for skilled therapy services and certify that the services are required and that they will be provided while the patient is under my care. If I have further questions, I will notify the evaluating therapist.    Please review Patient's OT evaluation and if you agree sign/date and fax back to us at our [address] [place] [address] OT fax [phone].  Thank you for your referral!

## 2021-10-07 ENCOUNTER — HOSPITAL ENCOUNTER (OUTPATIENT)
Dept: OCCUPATIONAL THERAPY | Age: 46
Setting detail: THERAPIES SERIES
Discharge: HOME | End: 2021-10-07
Payer: COMMERCIAL

## 2021-10-07 NOTE — PROGRESS NOTES
Phone: [phone] Fax: [phone]    Occupational Therapy   Cancellation    Patient Name:  Siobhan Tang  : 1975  Date:  10/7/2021  MRN: [id number]    For today's appointment patient:       Waiting on [facility] authorization following evaluation for approval to further treat pt. Pt was called, cancelled by therapist, and made aware of current status. [facility] prior to clinic - he may call back to schedule.      Electronically signed by: JUAN Gonzales/AG, Patty Herrera 87, #[id number]

## 2021-10-13 ENCOUNTER — HOSPITAL ENCOUNTER (OUTPATIENT)
Dept: OCCUPATIONAL THERAPY | Age: 46
Setting detail: THERAPIES SERIES
Discharge: HOME | End: 2021-10-13
Payer: COMMERCIAL

## 2021-10-13 PROCEDURE — 97530 THERAPEUTIC ACTIVITIES: CPT

## 2021-10-13 PROCEDURE — 97022 WHIRLPOOL THERAPY: CPT

## 2021-10-13 PROCEDURE — 97140 MANUAL THERAPY 1/> REGIONS: CPT

## 2021-10-13 PROCEDURE — 97110 THERAPEUTIC EXERCISES: CPT

## 2021-10-13 NOTE — PROGRESS NOTES
OCCUPATIONAL THERAPY PROGRESS NOTE    Date:  10/13/2021  Initial Evaluation Date: 10/05/2021    Patient Name:  Luis Cox    :  1975    RRestrictions/Precautions: per MCP fracture protocol, low fall risk  Diagnosis:  Left 4th MCP fracture -- S62.365D (ICD-10-CM) - Closed nondisplaced fracture of neck of fourth metacarpal bone of left hand with routine healing, subsequent encounter  Date of Surgery/Injury: 2021 (inj)     [id number]   Plan of care signed (Y/N): N  Visit# / total visits: 2 / 10- authorized until 2021     Referring Practitioner:  Zahra Bryan PA-C  Specific Practitioner Orders: ROM, Strengthening,  strength, modalities and manual therapy as needed. HEP     Assessment of current deficits   []? Functional mobility             []? ADLs          [x]? Strength                 []? Cognition   []? Functional transfers           [x]? IADLs         []? Safety Awareness  [x]? Endurance   [x]? Fine Motor Coordination    []? Balance      []? Vision/perception   []? Sensation     []? Gross Motor Coordination [x]? ROM          [x]? Pain                        []? Edema          []? Scar Adhesion/Skin Integrity      OT PLAN OF CARE   OT POC based on physician orders, patient diagnosis and results of clinical assessment.     Frequency/Duration: 1-2x/week for 6 weeks  /  Certification Period From: 10/05/2021  To: 2021     Specific OT Treatment to include:   [x]? Instruction in HEP                   Modalities:  [x]?  Therapeutic Exercise                 [x]? Ultrasound               []? Electrical Stimulation/Attended  [x]? PROM/Stretching                    [x]? Fluidotherapy          [x]?   Paraffin                   [x]? AAROM  [x]?  AROM                 []? Iontophoresis:   [x]? Tripp Cortes                                       []? Neuromuscular Re-Ed             []? ADL/IADL re-training    [x]?  Therapeutic Activity                  [x]? Pain Management with/without modalities PRN                 [x]?  Manual Therapy                      [x]? Splinting                                   []? Scar Management                   []?Joint Protection/Training  []? Ergonomics                             [x]?  Joint Mobilization                      []? Adaptive Equipment Assessment/Training                             [x]?  Manual Edema Mobilization   [x]?  Myofascial Release                 [x]? Energy Conservation/Work Simplification  [x]? GM/FM Coordination                [x]? Safety retraining/education per  individual diagnosis/goals  []? Desensitization        Patient Specific Goal: Pt stated goal is to be able to regain full use of L hand                             GOALS (Long term same as Short term):  1.) Patient will demonstrate good understanding of home program (exercises/activities/diagnosis/prognosis/goals) with good accuracy. 2.) Patient will demonstrate increased active/passive range of motion of their L 4th MCP and DIP to [facility] for ADL/IADL completion. 3.) Patient will demonstrate increased  strength of at least 20-30  pounds of their left hand. 4.) Patient to report 100% compliance with their splint wear, care, and precautions if needed. 5.) Patient will decrease QuickDASH score to 20% or less for increased participation in daily functional activities. Pain Level: no pain reported today    Subjective: \"I've been doing some exercises at home, and I'm happy my finger is moving more. \"     Objective:  Engaged pt in activities focused on strengthening, ROM, and functional activity performance. INTERVENTION: COMPLETED: REPS/TIME: SPECIFICS/COMMENTS:   Modality:      Fluidotherapy  x 15 min *Affected UE: Completes x 15 min to promote tissue healing, skin desensitization, reduce inflammation, and decrease pain. Actively completed SROM in all available planes with holds at end range during treatment. skilled OT intervention.   []  [facility] of care:   []  Discharge:    Shakeel Wise, OTR/L [id number]

## 2021-10-14 ENCOUNTER — HOSPITAL ENCOUNTER (OUTPATIENT)
Dept: OCCUPATIONAL THERAPY | Age: 46
Setting detail: THERAPIES SERIES
Discharge: HOME | End: 2021-10-14
Payer: COMMERCIAL

## 2021-10-14 PROCEDURE — 97018 PARAFFIN BATH THERAPY: CPT | Performed by: OCCUPATIONAL THERAPIST

## 2021-10-14 PROCEDURE — 97140 MANUAL THERAPY 1/> REGIONS: CPT | Performed by: OCCUPATIONAL THERAPIST

## 2021-10-14 PROCEDURE — 97110 THERAPEUTIC EXERCISES: CPT | Performed by: OCCUPATIONAL THERAPIST

## 2021-10-14 NOTE — PROGRESS NOTES
OCCUPATIONAL THERAPY PROGRESS NOTE    Date:  10/14/2021  Initial Evaluation Date: 10/05/2021    Patient Name:  Per Hamilton    :  1975    RRestrictions/Precautions: per MCP fracture protocol, low fall risk  Diagnosis:  Left 4th MCP fracture -- S62.365D (ICD-10-CM) - Closed nondisplaced fracture of neck of fourth metacarpal bone of left hand with routine healing, subsequent encounter  Date of Surgery/Injury: 2021 (inj)     Insurance/Certification information: [employer] (#[id number])  Plan of care signed (Y/N): N  Visit# / total visits: 3 / 10-12 Authorized until 2021     Referring Practitioner:  Ivan Pisano PA-C  Specific Practitioner Orders: ROM, Strengthening,  strength, modalities and manual therapy as needed. HEP     Assessment of current deficits   []? Functional mobility             []? ADLs          [x]? Strength                 []? Cognition   []? Functional transfers           [x]? IADLs         []? Safety Awareness  [x]? Endurance   [x]? Fine Motor Coordination    []? Balance      []? Vision/perception   []? Sensation     []? Gross Motor Coordination [x]? ROM          [x]? Pain                        []? Edema          []? Scar Adhesion/Skin Integrity      OT PLAN OF CARE   OT POC based on physician orders, patient diagnosis and results of clinical assessment.     Frequency/Duration: 1-2x/week for 6 weeks  /  Certification Period From: 10/05/2021  To: 2021     Specific OT Treatment to include:   [x]? Instruction in HEP                   Modalities:  [x]?  Therapeutic Exercise                 [x]? Ultrasound               []? Electrical Stimulation/Attended  [x]? PROM/Stretching                    [x]? Fluidotherapy          [x]?   Paraffin                   [x]? AAROM  [x]?  AROM                 []? Iontophoresis:   [x]? Albino Shelley                                       []? Neuromuscular Re-Ed             []? ADL/IADL re-training    [x]?  Therapeutic Activity                  [x]? Pain Management with/without modalities PRN                 [x]?  Manual Therapy                      [x]? Splinting                                   []? Scar Management                   []?Joint Protection/Training  []? Ergonomics                             [x]?  Joint Mobilization                      []? Adaptive Equipment Assessment/Training                             [x]?  Manual Edema Mobilization   [x]?  Myofascial Release                 [x]? Energy Conservation/Work Simplification  [x]? GM/FM Coordination                [x]? Safety retraining/education per  individual diagnosis/goals  []? Desensitization        Patient Specific Goal: Pt stated goal is to be able to regain full use of L hand                             GOALS (Long term same as Short term):  1.) Patient will demonstrate good understanding of home program (exercises/activities/diagnosis/prognosis/goals) with good accuracy. 2.) Patient will demonstrate increased active/passive range of motion of their L 4th MCP and DIP to [facility] for ADL/IADL completion. 3.) Patient will demonstrate increased  strength of at least 20-30  pounds of their left hand. 4.) Patient to report 100% compliance with their splint wear, care, and precautions if needed. 5.) Patient will decrease QuickDASH score to 20% or less for increased participation in daily functional activities. Pain Level: Mild soreness over metacarpals of the RF and SF. Subjective: \"I've been doing some exercises at home, and I'm happy my finger is moving more. \"     Objective:  Engaged pt in activities focused on strengthening, ROM, and functional activity performance. INTERVENTION: COMPLETED: SPECIFICS/COMMENTS:   Modality:     Fluidotherapy   *Affected UE: Completes x 15 min to promote tissue healing, skin desensitization, reduce inflammation, and decrease pain.   Actively completed SROM in all available planes with holds at end range during treatment. Paraffin x 10 min with heating agent to increase joint mobility and soft tissue elasticity. Performed with PROM. AROM/AAROM:     L Digital AROM x -RF/SF Isolated Blocking: PIP and DIP x 8 reps ea with 3 sec holds  -MCP blocking digits 2-5 x 10 reps with stretch provided by therapist at end range  -Abd/add of RF/SF:  pennies flat on table using MF/RF/SF abd/add to strengthen intrinsic muscles. PROM/Stretching:     L 4th digit x Flexion/extension of MCP, PIP, and DIP    L wrist  Joint mobilizations/stretches along wrist (flexion/extension, pro/sup, rd/ud) and digits (flexion/extension) to address stiffness        Scar Mass/Edema Control:     Scar/Soft Tissue x Deep pressure around the MCP's of RF and SF to release scar tissue build up causing stiffness in the MCPs. Therapeutic Activity:               Strengthening:     L Digital/Hand/ x -Hand Gripper: 55# to increase functional  strength in L hand. MAX OUT: 26 reps w/ 3 sec holds  -Squeezing/manipulating green theraputty in L hand to increase  strength  -Claw grasp and pull with medium large mass of putty  -Isometric Strengthening: MCP flexion/extension with wrist blocked and digits in intrinsic + position against therapist resistance x 10 reps ea and 5 sec holds. L Pinch  Roll out green putty and use tripod pinch along length of putty   Weight bearing/strength  Pressing L hand into green putty   L Forearm/Wrist x -15# Flexbar: pronation, supination, and twisting. Isometric shaking 30 sec on/30 sec off x 3 rounds. Other:                 Assessment/Comments: Pt is making Good progress toward stated plan of care. Increased resistance and reps today with good tolerance and no pain voiced. Addressing muscular endurance with prolonged holds which appears to fatigue the pt's hand.  Mild tenderness around the SF metacarpal. Overall doing well, will continue with focus on reducing stiffness throughout digits and strengthening for a safe RTW. -Rehab Potential: Good  -Requires OT Follow Up: Yes              Time In: 9:00            Time Out: 10:00            Visit #: 3    TOTAL Units Authorized: 48 units until 12/6/21             CODE  Minutes  Units   70338 Fluidotherapy     86101 Manual 15 1   91391 Therapeutic Ex 30 2   01160 Therapeutic Activity     53656 Paraffin 10 1     55 Total Used: 8/48     Plan:   [x]  Continues Plan of care: Treatment covered based on POC and graduated to patient's progress. Pt education continues at each visit to obtain maximum benefits from skilled OT intervention.   []  Alter Plan of care:   []  Discharge:    Luis M Joshua, OT R/L, Patty Herrera 87, #[id number]

## 2021-10-18 ENCOUNTER — HOSPITAL ENCOUNTER (OUTPATIENT)
Dept: OCCUPATIONAL THERAPY | Age: 46
Setting detail: THERAPIES SERIES
Discharge: HOME | End: 2021-10-18
Payer: COMMERCIAL

## 2021-10-18 PROCEDURE — 97018 PARAFFIN BATH THERAPY: CPT

## 2021-10-18 PROCEDURE — 97110 THERAPEUTIC EXERCISES: CPT

## 2021-10-18 PROCEDURE — 97140 MANUAL THERAPY 1/> REGIONS: CPT

## 2021-10-18 NOTE — PROGRESS NOTES
OCCUPATIONAL THERAPY PROGRESS NOTE    Date:  10/18/2021  Initial Evaluation Date: 10/05/2021    Patient Name:  Kirsty Olvera    :  1975  Evaluating Therapist:  Janet Benson OT    RRestrictions/Precautions: per MCP fracture protocol, low fall risk  Diagnosis:  Left 4th MCP fracture -- S62.365D (ICD-10-CM) - Closed nondisplaced fracture of neck of fourth metacarpal bone of left hand with routine healing, subsequent encounter  Date of Surgery/Injury: 2021 (inj)     Insurance/Certification information: [facility] (#[id number])  Plan of care signed (Y/N): N  Visit# / total visits:4 / 10- Authorized until 2021     Referring Practitioner:  Truong Conley PA-C  Specific Practitioner Orders: ROM, Strengthening,  strength, modalities and manual therapy as needed. HEP     Assessment of current deficits   [] Functional mobility             [] ADLs          [x] Strength                 [] Cognition   [] Functional transfers           [x] IADLs         [] Safety Awareness  [x] Endurance   [x] Fine Motor Coordination    [] Balance      [] Vision/perception   [] Sensation     [] Gross Motor Coordination [x] ROM          [x] Pain                        [] Edema          [] Scar Adhesion/Skin Integrity      OT PLAN OF CARE   OT POC based on physician orders, patient diagnosis and results of clinical assessment.      Frequency/Duration: 1-2x/week for 6 weeks  /  Certification Period From: 10/05/2021  To: 2021     Specific OT Treatment to include:   [x] Instruction in HEP                   Modalities:  [x] Therapeutic Exercise                 [x] Ultrasound               [] Electrical Stimulation/Attended  [x] PROM/Stretching                    [x] Fluidotherapy          [x]  Paraffin                   [x] AAROM  [x] AROM                 [] Iontophoresis:   [x] Tendon Glides                                               [] Neuromuscular Re-Ed             [] ADL/IADL re-training    [x] Therapeutic Activity                  [x] Pain Management with/without modalities PRN                 [x] Manual Therapy                      [x] Splinting                                   [] Scar Management                   []Joint Protection/Training  []Ergonomics                             [x] Joint Mobilization                      [] Adaptive Equipment Assessment/Training                             [x] Manual Edema Mobilization   [x] Myofascial Release                 [x] Energy Conservation/Work Simplification  [x] GM/FM Coordination                [x] Safety retraining/education per  individual diagnosis/goals  [] Desensitization        Patient Specific Goal: Pt stated goal is to be able to regain full use of L hand                             GOALS (Long term same as Short term):  1.) Patient will demonstrate good understanding of home program (exercises/activities/diagnosis/prognosis/goals) with good accuracy. 2.) Patient will demonstrate increased active/passive range of motion of their L 4th MCP and DIP to [facility] for ADL/IADL completion. 3.) Patient will demonstrate increased  strength of at least 20-30  pounds of their left hand. 4.) Patient to report 100% compliance with their splint wear, care, and precautions if needed. 5.) Patient will decrease QuickDASH score to 20% or less for increased participation in daily functional activities. Pain Level: Mild soreness over metacarpals of the RF and SF. Subjective: \"I used the putty this weekend. It was alright. \"     Objective:  Engaged pt in activities focused on strengthening, ROM, and functional activity performance. INTERVENTION: COMPLETED: SPECIFICS/COMMENTS:   Modality:     Fluidotherapy   *Affected UE: Completes x 15 min to promote tissue healing, skin desensitization, reduce inflammation, and decrease pain. Actively completed SROM in all available planes with holds at end range during treatment.    Paraffin x 10 min with heating agent to increase joint mobility and soft tissue elasticity. Performed with PROM. AROM/AAROM:     L Digital AROM x -RF/SF Isolated Blocking: PIP and DIP x 8 reps ea with 3 sec holds  -MCP blocking digits 2-5 x 10 reps with stretch provided by therapist at end range  -Abd/add of RF/SF:  pennies flat on table using MF/RF/SF abd/add to strengthen intrinsic muscles. PROM/Stretching:     L 4th digit x Flexion/extension of MCP, PIP, and DIP    L wrist  Joint mobilizations/stretches along wrist (flexion/extension, pro/sup, rd/ud) and digits (flexion/extension) to address stiffness        Scar Mass/Edema Control:     Scar/Soft Tissue x Deep pressure around the MCP's of RF and SF to release scar tissue build up causing stiffness in the MCPs. Therapeutic Activity:               Strengthening:     LUE forearm/ x 2# hammer with focus on maintaining  during pro/sup strengthening ex PRE with descending count 15, 122, 10   LUE forearm/ x Wrist flex/ex with focus on achieving increased functional activity pranav and  using 7# and 5# free wts with PRE in descending reps: 15, 12, 12 x3 sets   L Digital/Hand/ x -Hand Gripper: 55# to increase functional  strength in L hand. MAX OUT: 60 reps w/ 3 sec holds    -Isometric Strengthening: MCP flexion/extension with wrist blocked and digits in intrinsic + position against therapist resistance x 10 reps ea and 5 sec holds. L Pinch     Weight bearing/strength     L Forearm/Wrist x Med green Flexbar: pronation, supination, and twisting. Isometric shaking 30 sec on/30 sec off x 3 rounds. Other:                 Assessment/Comments: Pt is making Good progress toward stated plan of care. Increased resistance and reps today with good tolerance and no pain voiced. Addressing muscular endurance with prolonged holds which appears to fatigue the pt's hand. No edema noted. No pain voiced.   Overall doing well, will continue with focus on reducing stiffness throughout digits and strengthening for RTW. -Rehab Potential: Good  -Requires OT Follow Up: Yes              Time In: 9:00            Time Out: 10:00         TOTAL Units Authorized: 48 units until 12/6/21             CODE  Minutes  Units   26694 Fluidotherapy     77720 Manual 15 1   46063 Therapeutic Ex 30 2   12222 Therapeutic Activity     29041 Paraffin 10 1     55 Total Used: 12 / 48     Plan:   [x]  Continues Plan of care: Treatment covered based on POC and graduated to patient's progress. Pt education continues at each visit to obtain maximum benefits from skilled OT intervention.   []  Alter Plan of care:   []  Discharge:    JADA Herrera/L [id number] [facility], [address] [place] [address]

## 2021-10-21 ENCOUNTER — OFFICE VISIT (OUTPATIENT)
Dept: ORTHOPEDIC SURGERY | Age: 46
End: 2021-10-21
Payer: COMMERCIAL

## 2021-10-21 ENCOUNTER — HOSPITAL ENCOUNTER (OUTPATIENT)
Dept: GENERAL RADIOLOGY | Age: 46
Discharge: HOME | End: 2021-10-23
Payer: COMMERCIAL

## 2021-10-21 VITALS — TEMPERATURE: 97.6 F

## 2021-10-21 DIAGNOSIS — S62.365D CLOSED NONDISPLACED FRACTURE OF NECK OF FOURTH METACARPAL BONE OF LEFT HAND WITH ROUTINE HEALING, SUBSEQUENT ENCOUNTER: ICD-10-CM

## 2021-10-21 PROCEDURE — 99212 OFFICE O/P EST SF 10 MIN: CPT | Performed by: PHYSICIAN ASSISTANT

## 2021-10-21 PROCEDURE — 99024 POSTOP FOLLOW-UP VISIT: CPT | Performed by: PHYSICIAN ASSISTANT

## 2021-10-21 PROCEDURE — 73130 X-RAY EXAM OF HAND: CPT

## 2021-10-21 NOTE — PROGRESS NOTES
Patient presents today for 9 wk f/u  (ED 8/6/2021)  fracture of the L hand 4th digit (Per patient not worker's comp). Patient states he is doing well, no pain. He does have some soreness, after therapy which the last session was yesterday. He states the PT person told him he does not need to come anymore. He does do the exercises at home.

## 2021-10-21 NOTE — PROGRESS NOTES
Chief Complaint   Patient presents with    Follow-up     9 wk f/u  (ED 8/6/2021)  fracture of the L hand 4th digit (Per patient not worker's comp)    Other     Patient states he is doing well. SUBJECTIVE: Desi Hahn presents today for a follow-up visit regarding his left hand. He is now 2-1/2 months out from sustaining a left fourth finger metacarpal fracture treated nonoperatively. States that he has made good progress in OT. He states that the strength and range of motion has significantly improved in his left hand. States that he is able to do his daily activities without difficulty. No numbness, tingling or paresthesias in the hand. Review of Systems -   General ROS: negative for - chills, fatigue, fever or night sweats  Respiratory ROS: no cough, shortness of breath, or wheezing  Cardiovascular ROS: no chest pain or dyspnea on exertion  Gastrointestinal ROS: no abdominal pain, change in bowel habits, or black or bloody stools  Genitourinary: no hematuria, dysuria, or incontinence   Musculoskeletal ROS:see above  Neurological ROS: no TIA or stroke symptoms       OBJECTIVE:   Alert and oriented X 3, no acute distress, respirations easy and unlabored with no audible wheezes, skin warm and dry, speech and dress appropriate for noted age, affect euthymic. Extremity:  Left Upper Extremity  Skin is clean dry and intact  no edema noted  Radial pulse palpable, fingers warm with BCR  Flex/extension intact to wrist, thumb and fingers  Finger opposition intact  Finger adduction/abduction intact  Finger crossover intact  Subjectively states sensation intact to radial/medial/ulnar distribution    XR: 10/21/21   3 views left hand demonstrate stable appearance of fourth metacarpal neck fracture with bridging callus and mild angulation. No increased displacement compared to prior films. Temp 97.6 °F (36.4 °C) (Oral)     ASSESSMENT:   Diagnosis Orders   1.  Closed nondisplaced fracture of neck of fourth metacarpal bone of left hand with routine healing, subsequent encounter       PLAN:  X-rays reviewed and discussed. Continue activities as tolerated with the left hand. Continue OT for a couple more weeks then transition to a home exercise program.    Follow-up as needed. Call if any questions or concerns. Electronically signed by TIMOTHY Garcia on 10/21/2021 at 1:18 PM  Note: This report was completed using [employer] voiced recognition software. Every effort has been made to ensure accuracy; however, inadvertent computerized transcription errors may be present.

## 2021-10-25 ENCOUNTER — HOSPITAL ENCOUNTER (OUTPATIENT)
Dept: OCCUPATIONAL THERAPY | Age: 46
Setting detail: THERAPIES SERIES
Discharge: HOME | End: 2021-10-25
Payer: COMMERCIAL

## 2021-10-25 NOTE — PROGRESS NOTES
[facility]    Outpatient Occupational Therapy    Phone: [phone]   Fax: [phone]     Cancellation/No-show Note    Date:  10/25/2021    Patient Name:  Phuong Vogt    :  1975     PT ID: [id number]    Total missed visits including today: 2   Total number of no shows: 1    For today's appointment patient:    [x]  Cancelled & Rescheduled appointment  []  No-show     Reason given by patient:  []  Patient ill  []  Conflicting appointment  []  No transportation    []  Conflict with work  []  No reason given  [x]  Other:       Comments: Thought he was done. Will be in Wed at reg scheduled time and reassessment will be done.   Physician released him      Electronically signed by:  Angie Libman, OTA, COTA/L  [id number]

## 2021-10-27 ENCOUNTER — HOSPITAL ENCOUNTER (OUTPATIENT)
Dept: OCCUPATIONAL THERAPY | Age: 46
Setting detail: THERAPIES SERIES
Discharge: HOME | End: 2021-10-27
Payer: COMMERCIAL

## 2021-10-27 PROCEDURE — 97022 WHIRLPOOL THERAPY: CPT

## 2021-10-27 PROCEDURE — 97110 THERAPEUTIC EXERCISES: CPT

## 2021-10-27 PROCEDURE — 97140 MANUAL THERAPY 1/> REGIONS: CPT

## 2021-10-27 NOTE — PROGRESS NOTES
OCCUPATIONAL THERAPY PROGRESS NOTE    Date:  10/27/2021  Initial Evaluation Date: 10/05/2021    Patient Name:  Junior Phalen    :  1975  Evaluating Therapist:  Cristofer Reyes OT    RRestrictions/Precautions: per MCP fracture protocol, low fall risk  Diagnosis:  Left 4th MCP fracture -- S62.365D (ICD-10-CM) - Closed nondisplaced fracture of neck of fourth metacarpal bone of left hand with routine healing, subsequent encounter  Date of Surgery/Injury: 2021 (inj)     Insurance/Certification information: [facility] (#[id number])  Plan of care signed (Y/N): N  Visit# / total visits:  5 / 10- Authorized until 2021 for 48 total units     Referring Practitioner:  Danielito Hernandez PA-C  Specific Practitioner Orders: ROM, Strengthening,  strength, modalities and manual therapy as needed. HEP     Assessment of current deficits   [] Functional mobility             [] ADLs          [x] Strength                 [] Cognition   [] Functional transfers           [x] IADLs         [] Safety Awareness  [x] Endurance   [x] Fine Motor Coordination    [] Balance      [] Vision/perception   [] Sensation     [] Gross Motor Coordination [x] ROM          [x] Pain                        [] Edema          [] Scar Adhesion/Skin Integrity      OT PLAN OF CARE   OT POC based on physician orders, patient diagnosis and results of clinical assessment.      Frequency/Duration: 1-2x/week for 6 weeks  /  Certification Period From: 10/05/2021  To: 2021     Specific OT Treatment to include:   [x] Instruction in HEP                   Modalities:  [x] Therapeutic Exercise                 [x] Ultrasound               [] Electrical Stimulation/Attended  [x] PROM/Stretching                    [x] Fluidotherapy          [x]  Paraffin                   [x] AAROM  [x] AROM                 [] Iontophoresis:   [x] Tendon Glides                                               [] Neuromuscular Re-Ed             [] ADL/IADL re-training [x] Therapeutic Activity                  [x] Pain Management with/without modalities PRN                 [x] Manual Therapy                      [x] Splinting                                   [] Scar Management                   []Joint Protection/Training  []Ergonomics                             [x] Joint Mobilization                      [] Adaptive Equipment Assessment/Training                             [x] Manual Edema Mobilization   [x] Myofascial Release                 [x] Energy Conservation/Work Simplification  [x] GM/FM Coordination                [x] Safety retraining/education per  individual diagnosis/goals  [] Desensitization        Patient Specific Goal: Pt stated goal is to be able to regain full use of L hand                             GOALS (Long term same as Short term):  1.) Patient will demonstrate good understanding of home program (exercises/activities/diagnosis/prognosis/goals) with good accuracy. Progressing and advanced as needed. 2.) Patient will demonstrate increased active/passive range of motion of their L 4th MCP and DIP to [facility] for ADL/IADL completion. Goal exceeded:  WNL  3.) Patient will demonstrate increased  strength of at least 20-30  pounds of their left hand. Progressing:  /pinch:  R: 115#/33# L = 92#/25#  4.) Patient to report 100% compliance with their splint wear, care, and precautions if needed. Goal reached and discharged  5.) Patient will decrease QuickDASH score to 20% or less for increased participation in daily functional activities. TBA    Pain Level: Mild soreness over metacarpals of the RF and SF. Subjective: \"I returned to work, but I'm have difficult with endurance and my . I get tired. It is aching and stiff by end of the shift. \"     Objective:  Engaged pt in activities focused on strengthening, ROM, and functional activity performance.   INTERVENTION: COMPLETED: SPECIFICS/COMMENTS:   Modality:     Fluidotherapy  x *Affected UE: Completes x 15 min to promote tissue healing, skin desensitization, reduce inflammation, and decrease pain. Actively completed SROM in all available planes with holds at end range during treatment. Paraffin  10 min with heating agent to increase joint mobility and soft tissue elasticity. Performed with PROM. AROM/AAROM:     L Digital AROM  -RF/SF Isolated Blocking: PIP and DIP x 8 reps ea with 3 sec holds  -MCP blocking digits 2-5 x 10 reps with stretch provided by therapist at end range  -Abd/add of RF/SF:  pennies flat on table using MF/RF/SF abd/add to strengthen intrinsic muscles. PROM/Stretching:     L 4th digit x Flexion/extension of MCP, PIP, and DIP  To address stiffness   L wrist  Joint mobilizations/stretches along wrist (flexion/extension, pro/sup, rd/ud) and digits (flexion/extension) to address stiffness        Scar Mass/Edema Control:     Scar/Soft Tissue x Deep pressure around the MCP's of RF and SF to release scar tissue build up causing stiffness in the MCPs. Therapeutic Activity:               Strengthening:     LUE forearm/ x 2# hammer with focus on maintaining  during pro/sup strengthening ex PRE with descending count 15, 122, 10   LUE forearm/ x Wrist flex/ex with focus on achieving increased functional activity pranav and  using 7# and 5# free wts with PRE in descending reps: 15, 12, 12 x3 sets   L Digital/Hand/ x -Hand Gripper: 55# to increase functional  strength in L hand. MAX OUT: 75  reps w/ 3 sec holds   L Pinch     Weight bearing/strength     L Forearm/Wrist x Med green Flexbar: pronation, supination, and twisting. Isometric shaking 30 sec on/30 sec off x 3 rounds. Other:                 Assessment/Comments: Pt is making Good progress toward stated plan of care. Increased resistance and reps today with good tolerance and no pain voiced.  Addressing muscular endurance with prolonged holds which appears to fatigue the pt's hand.No edema noted. No pain voiced. Overall doing well, will continue with focus on reducing stiffness throughout digits and strengthening for RTW. Increased resistance of putty for HEP to heavy(blue). -Rehab Potential: Good  -Requires OT Follow Up: Yes              Time In: 9:00            Time Out: 10:00         TOTAL Units Authorized: 48 units until 12/6/21             CODE  Minutes  Units   00325 Fluidotherapy 10 1   78877 Manual 15 1   93975 Therapeutic Ex 35 2   08427 Therapeutic Activity     06074 Paraffin       55 Total Used: 16 / 48     Plan:   [x]  Continues Plan of care: Treatment covered based on POC and graduated to patient's progress. Pt education continues at each visit to obtain maximum benefits from skilled OT intervention.   []  [address] of care:   []  Discharge:    JADA Florian WALKER/L [id number]  Roger Stiles OTR/L [id number]

## 2021-11-01 ENCOUNTER — APPOINTMENT (OUTPATIENT)
Dept: OCCUPATIONAL THERAPY | Age: 46
End: 2021-11-01
Payer: COMMERCIAL

## 2021-11-03 ENCOUNTER — HOSPITAL ENCOUNTER (OUTPATIENT)
Dept: OCCUPATIONAL THERAPY | Age: 46
Setting detail: THERAPIES SERIES
Discharge: HOME | End: 2021-11-03
Payer: COMMERCIAL

## 2021-11-03 PROCEDURE — 97110 THERAPEUTIC EXERCISES: CPT

## 2021-11-03 PROCEDURE — 97022 WHIRLPOOL THERAPY: CPT

## 2021-11-03 PROCEDURE — 97140 MANUAL THERAPY 1/> REGIONS: CPT

## 2021-11-03 NOTE — PROGRESS NOTES
OCCUPATIONAL THERAPY PROGRESS NOTE    Date:  11/3/2021  Initial Evaluation Date: 10/05/2021    Patient Name:  Iza Mena    :  1975  Evaluating Therapist:  Asad Noguera OT    RRestrictions/Precautions: per MCP fracture protocol, low fall risk  Diagnosis:  Left 4th MCP fracture -- S62.365D (ICD-10-CM) - Closed nondisplaced fracture of neck of fourth metacarpal bone of left hand with routine healing, subsequent encounter  Date of Surgery/Injury: 2021 (inj)     Insurance/Certification information: Cristina (#[id number])  Plan of care signed (Y/N): N  Visit# / total visits:  6 / 10- Authorized until 2021 for 48 total units     Referring Practitioner:  Norma Montano PA-C  Specific Practitioner Orders: ROM, Strengthening,  strength, modalities and manual therapy as needed. HEP     Assessment of current deficits   [] Functional mobility             [] ADLs          [x] Strength                 [] Cognition   [] Functional transfers           [x] IADLs         [] Safety Awareness  [x] Endurance   [x] Fine Motor Coordination    [] Balance      [] Vision/perception   [] Sensation     [] Gross Motor Coordination [x] ROM          [x] Pain                        [] Edema          [] Scar Adhesion/Skin Integrity      OT PLAN OF CARE   OT POC based on physician orders, patient diagnosis and results of clinical assessment.      Frequency/Duration: 1-2x/week for 6 weeks  /  Certification Period From: 10/05/2021  To: 2021     Specific OT Treatment to include:   [x] Instruction in HEP                   Modalities:  [x] Therapeutic Exercise                 [x] Ultrasound               [] Electrical Stimulation/Attended  [x] PROM/Stretching                    [x] Fluidotherapy          [x]  Paraffin                   [x] AAROM  [x] AROM                 [] Iontophoresis:   [x] Tendon Glides                                               [] Neuromuscular Re-Ed             [] ADL/IADL re-training [x] Therapeutic Activity                  [x] Pain Management with/without modalities PRN                 [x] Manual Therapy                      [x] Splinting                                   [] Scar Management                   []Joint Protection/Training  []Ergonomics                             [x] Joint Mobilization                      [] Adaptive Equipment Assessment/Training                             [x] Manual Edema Mobilization   [x] Myofascial Release                 [x] Energy Conservation/Work Simplification  [x] GM/FM Coordination                [x] Safety retraining/education per  individual diagnosis/goals  [] Desensitization        Patient Specific Goal: Pt stated goal is to be able to regain full use of L hand                             GOALS (Long term same as Short term):  1.) Patient will demonstrate good understanding of home program (exercises/activities/diagnosis/prognosis/goals) with good accuracy. Progressing and advanced as needed. 2.) Patient will demonstrate increased active/passive range of motion of their L 4th MCP and DIP to [facility] for ADL/IADL completion. Goal exceeded:  WNL  3.) Patient will demonstrate increased  strength of at least 20-30  pounds of their left hand. Progressing:  /pinch:  R: 115#/33# L = 92#/25#  4.) Patient to report 100% compliance with their splint wear, care, and precautions if needed. Goal reached and discharged  5.) Patient will decrease QuickDASH score to 20% or less for increased participation in daily functional activities. TBA    Pain Level: Mild soreness over metacarpals of the RF and SF. Subjective: \"I have felt weaker lately. Not sure why. \"     Objective:  Engaged pt in activities focused on strengthening, ROM, and functional activity performance.   INTERVENTION: COMPLETED: SPECIFICS/COMMENTS:   Modality:     Fluidotherapy  x *Affected UE: Completes x 15 min to promote tissue healing, skin desensitization, reduce inflammation, and decrease pain. Actively completed SROM in all available planes with holds at end range during treatment. Paraffin  10 min with heating agent to increase joint mobility and soft tissue elasticity. Performed with PROM. AROM/AAROM:     L Digital AROM  -RF/SF Isolated Blocking: PIP and DIP x 8 reps ea with 3 sec holds  -MCP blocking digits 2-5 x 10 reps with stretch provided by therapist at end range  -Abd/add of RF/SF:  pennies flat on table using MF/RF/SF abd/add to strengthen intrinsic muscles. PROM/Stretching:     L 4th digit x Flexion/extension of MCP, PIP, and DIP  To address stiffness into moderate resistive putty   L wrist  Joint mobilizations/stretches along wrist (flexion/extension, pro/sup, rd/ud) and digits (flexion/extension) to address stiffness        Scar Mass/Edema Control:     Scar/Soft Tissue x Deep pressure around the MCP's of RF and SF to release scar tissue build up causing stiffness in the MCPs. Followed by MFR up arm        Therapeutic Activity:               Strengthening:     LUE forearm/ x 2# hammer with focus on maintaining  during pro/sup strengthening ex PRE with descending count 15, 12, 10 @ 2 sets   LUE forearm/ x Wrist flex/ex with focus on achieving increased functional activity pranav and  using 9# and 5# free wts with PRE in descending reps: 20, 15, 12  2 sets   L Digital/Hand/ x -Hand Gripper: 55# to increase functional  strength in L hand. MAX OUT: 100  reps w/ 3 sec holds   L Pinch     Weight bearing/strength     L Forearm/Wrist x Med green Flexbar: pronation, supination, and twisting. Isometric shaking 30 sec on/30 sec off x 3 rounds. Bicep hammer curls PRE with decending reps to faciliate functional strengthening for RTW using 9# freewt at 20, 15 ,12 reps @ 2 sets   Other:                 Assessment/Comments: Pt is making Good progress toward stated plan of care.  Increased resistance and reps today with good

## 2021-11-08 ENCOUNTER — APPOINTMENT (OUTPATIENT)
Dept: OCCUPATIONAL THERAPY | Age: 46
End: 2021-11-08
Payer: COMMERCIAL

## 2021-11-10 ENCOUNTER — HOSPITAL ENCOUNTER (OUTPATIENT)
Dept: OCCUPATIONAL THERAPY | Age: 46
Setting detail: THERAPIES SERIES
Discharge: HOME | End: 2021-11-10
Payer: COMMERCIAL

## 2021-11-10 NOTE — PROGRESS NOTES
[facility]    Outpatient Occupational Therapy    Phone: [phone]   Fax: [phone]     Cancellation/No-show Note    Date:  11/10/2021    Patient Name:  Donna Bautista    :  1975     PT ID: [id number]    Total missed visits including today: 3   Total number of no shows: 1    For today's appointment patient:    [x]  Cancelled & Rescheduled appointment  []  No-show     Reason given by patient:  []  Patient ill  [x]  Conflicting appointment  []  No transportation    []  Conflict with work  []  No reason given  []  Other:       Comments:        Electronically signed by:  JADA Calvillo COTA/AG  [id number]

## 2021-12-09 PROCEDURE — 99282 EMERGENCY DEPT VISIT SF MDM: CPT

## 2021-12-10 ENCOUNTER — HOSPITAL ENCOUNTER (EMERGENCY)
Age: 46
Discharge: HOME | End: 2021-12-10
Payer: COMMERCIAL

## 2021-12-10 ENCOUNTER — APPOINTMENT (OUTPATIENT)
Dept: CT IMAGING | Age: 46
End: 2021-12-10
Payer: COMMERCIAL

## 2021-12-10 VITALS
DIASTOLIC BLOOD PRESSURE: 93 MMHG | OXYGEN SATURATION: 99 % | RESPIRATION RATE: 16 BRPM | HEIGHT: 69 IN | WEIGHT: 190 LBS | TEMPERATURE: 98.3 F | BODY MASS INDEX: 28.14 KG/M2 | HEART RATE: 63 BPM | SYSTOLIC BLOOD PRESSURE: 163 MMHG

## 2021-12-10 DIAGNOSIS — N20.0 KIDNEY STONE: Primary | ICD-10-CM

## 2021-12-10 DIAGNOSIS — R31.9 HEMATURIA, UNSPECIFIED TYPE: ICD-10-CM

## 2021-12-10 LAB
ALBUMIN SERPL-MCNC: 4.1 G/DL (ref 3.5–5.2)
ALP BLD-CCNC: 85 U/L (ref 40–129)
ALT SERPL-CCNC: 12 U/L (ref 0–40)
ANION GAP SERPL CALCULATED.3IONS-SCNC: 10 MMOL/L (ref 7–16)
AST SERPL-CCNC: 18 U/L (ref 0–39)
BACTERIA: ABNORMAL /HPF
BASOPHILS ABSOLUTE: 0.03 E9/L (ref 0–0.2)
BASOPHILS RELATIVE PERCENT: 0.3 % (ref 0–2)
BILIRUB SERPL-MCNC: 0.7 MG/DL (ref 0–1.2)
BILIRUBIN URINE: ABNORMAL
BLOOD, URINE: ABNORMAL
BUN BLDV-MCNC: 12 MG/DL (ref 6–20)
CALCIUM SERPL-MCNC: 9.1 MG/DL (ref 8.6–10.2)
CHLORIDE BLD-SCNC: 99 MMOL/L (ref 98–107)
CLARITY: CLEAR
CO2: 25 MMOL/L (ref 22–29)
COLOR: ABNORMAL
CREAT SERPL-MCNC: 1.2 MG/DL (ref 0.7–1.2)
EOSINOPHILS ABSOLUTE: 0.13 E9/L (ref 0.05–0.5)
EOSINOPHILS RELATIVE PERCENT: 1.2 % (ref 0–6)
GFR AFRICAN AMERICAN: >60
GFR NON-AFRICAN AMERICAN: >60 ML/MIN/1.73
GLUCOSE BLD-MCNC: 103 MG/DL (ref 74–99)
GLUCOSE URINE: NEGATIVE MG/DL
HCT VFR BLD CALC: 43.4 % (ref 37–54)
HEMOGLOBIN: 14.9 G/DL (ref 12.5–16.5)
IMMATURE GRANULOCYTES #: 0.02 E9/L
IMMATURE GRANULOCYTES %: 0.2 % (ref 0–5)
KETONES, URINE: NEGATIVE MG/DL
LEUKOCYTE ESTERASE, URINE: ABNORMAL
LYMPHOCYTES ABSOLUTE: 4.36 E9/L (ref 1.5–4)
LYMPHOCYTES RELATIVE PERCENT: 39.5 % (ref 20–42)
MCH RBC QN AUTO: 30.7 PG (ref 26–35)
MCHC RBC AUTO-ENTMCNC: 34.3 % (ref 32–34.5)
MCV RBC AUTO: 89.5 FL (ref 80–99.9)
MONOCYTES ABSOLUTE: 0.82 E9/L (ref 0.1–0.95)
MONOCYTES RELATIVE PERCENT: 7.4 % (ref 2–12)
NEUTROPHILS ABSOLUTE: 5.69 E9/L (ref 1.8–7.3)
NEUTROPHILS RELATIVE PERCENT: 51.4 % (ref 43–80)
NITRITE, URINE: NEGATIVE
PDW BLD-RTO: 13.3 FL (ref 11.5–15)
PH UA: 5.5 (ref 5–9)
PLATELET # BLD: 224 E9/L (ref 130–450)
PMV BLD AUTO: 11.3 FL (ref 7–12)
POTASSIUM SERPL-SCNC: 3.3 MMOL/L (ref 3.5–5)
PROTEIN UA: 100 MG/DL
RBC # BLD: 4.85 E12/L (ref 3.8–5.8)
RBC UA: >20 /HPF (ref 0–2)
SODIUM BLD-SCNC: 134 MMOL/L (ref 132–146)
SPECIFIC GRAVITY UA: 1.01 (ref 1–1.03)
TOTAL PROTEIN: 7.1 G/DL (ref 6.4–8.3)
UROBILINOGEN, URINE: 0.2 E.U./DL
WBC # BLD: 11.1 E9/L (ref 4.5–11.5)
WBC UA: ABNORMAL /HPF (ref 0–5)

## 2021-12-10 PROCEDURE — 74176 CT ABD & PELVIS W/O CONTRAST: CPT

## 2021-12-10 PROCEDURE — 80053 COMPREHEN METABOLIC PANEL: CPT

## 2021-12-10 PROCEDURE — 85025 COMPLETE CBC W/AUTO DIFF WBC: CPT

## 2021-12-10 PROCEDURE — 81001 URINALYSIS AUTO W/SCOPE: CPT

## 2021-12-10 PROCEDURE — 36415 COLL VENOUS BLD VENIPUNCTURE: CPT

## 2021-12-10 ASSESSMENT — PAIN DESCRIPTION - DESCRIPTORS: DESCRIPTORS: OTHER (COMMENT)

## 2021-12-10 ASSESSMENT — PAIN DESCRIPTION - ONSET: ONSET: ON-GOING

## 2021-12-10 ASSESSMENT — PAIN DESCRIPTION - LOCATION: LOCATION: FLANK

## 2021-12-10 ASSESSMENT — PAIN DESCRIPTION - PAIN TYPE: TYPE: ACUTE PAIN

## 2021-12-10 ASSESSMENT — PAIN SCALES - GENERAL: PAINLEVEL_OUTOF10: 2

## 2021-12-10 ASSESSMENT — PAIN DESCRIPTION - ORIENTATION: ORIENTATION: RIGHT

## 2021-12-10 NOTE — ED NOTES
FIRST PROVIDER CONTACT ASSESSMENT NOTE      Department of Emergency Medicine   12/10/21  12:30 AM EST    Chief Complaint: Hematuria (pain in back )      History of Present Illness:   Siobhan Tang is a 51 y.o. male who presents to the ED for    Medical History:  has a past medical history of Closed fracture of left distal radius, Essential hypertension, and Fracture of radial neck, right, closed. Surgical History:  has a past surgical history that includes Achilles tendon surgery (Left, 2/20/2019). Social History:  reports that he has never smoked. He has never used smokeless tobacco. He reports that he does not drink alcohol and does not use drugs. Family History: family history includes Diabetes in his father; High Blood Pressure in his mother. *ALLERGIES*     Patient has no known allergies. Physical Exam:      VS:  There were no vitals taken for this visit.      Initial Plan of Care:  Initiate Treatment-Testing, Proceed toTreatment Area When Bed Available for ED Attending/MLP to Continue Care    -----------------END OF FIRST PROVIDER CONTACT ASSESSMENT NOTE--------------  Electronically signed by SHIRLEY Mondragon CNP   DD: 12/10/21             SHIRLEY Bruce CNP  12/10/21 0030

## 2021-12-10 NOTE — ED PROVIDER NOTES
[address] [place] [address]  Department of Emergency Medicine   ED  Encounter Note  Admit Date/RoomTime: No admission date for patient encounter. ED Room: Room/bed info not found    NAME: Siobhan Tang  : 1975  MRN: [id number]     Chief Complaint:  Hematuria (pain in back )    History of Present Illness       Juliana Barton is a 51 y.o. old male who presents to the emergency department by private vehicle, for complaints of gradual onset of pain in the right flank without radiation which began 1 day(s) prior to arrival.  Since onset the symptoms have been waxing and waning and mild in severity. Siobhan Tang has a history of kidney stones. Associated sign's & symptoms: hematuria. The symptoms are aggravated by nothing and relieved by nothing. He denies headache, neck pain, fever, chills, chest pain, shortness of breath, cough, abdominal pain, nausea, vomiting, diarrhea, constipation, dysuria, and rash. .  ROS   Pertinent positives and negatives are stated within HPI, all other systems reviewed and are negative. Past Medical History:  has a past medical history of Closed fracture of left distal radius, Essential hypertension, and Fracture of radial neck, right, closed. Surgical History:  has a past surgical history that includes Achilles tendon surgery (Left, 2019). Social History:  reports that he has never smoked. He has never used smokeless tobacco. He reports that he does not drink alcohol and does not use drugs. Family History: family history includes Diabetes in his father; High Blood Pressure in his mother. Allergies: Patient has no known allergies.     Physical Exam   Oxygen Saturation Interpretation: Normal.        ED Triage Vitals [12/10/21 0028]   BP Temp Temp Source Pulse Resp SpO2 Height Weight   (!) 163/93 98.3 °F (36.8 °C) Oral 63 16 99 % 5' 9\" (1.753 m) 190 lb (86.2 kg)         Constitutional:  Alert, development consistent with age.  HEENT:  NC/NT. Airway patent. Neck:  Normal ROM. Supple. Respiratory:  Clear to auscultation and breath sounds equal.  CV:  Regular rate and rhythm, normal heart sounds, without pathological murmurs, ectopy, gallops, or rubs. GI:  normal appearing, non-distended with no visible hernias. Bowel sounds: normal bowel sounds. Tenderness: No abdominal tenderness, guarding, rebound, rigidity or pulsatile mass. .        Liver: non-tender. Spleen:  non-tender and non-palpable. /Back: CVA Tenderness: No CVA tenderness. Integument:  Normal turgor. Warm, dry, without visible rash, unless noted elsewhere. Lymphatics: No lymphangitis or adenopathy noted. Neurological:  Oriented. Motor functions intact.     Lab / Imaging Results   (All laboratory and radiology results have been personally reviewed by myself)  Labs:  Results for orders placed or performed during the hospital encounter of 12/09/21   URINALYSIS   Result Value Ref Range    Color, UA BLOODY Straw/Yellow    Clarity, UA Clear Clear    Glucose, Ur Negative Negative mg/dL    Bilirubin Urine SMALL (A) Negative    Ketones, Urine Negative Negative mg/dL    Specific Gravity, UA 1.015 1.005 - 1.030    Blood, Urine LARGE (A) Negative    pH, UA 5.5 5.0 - 9.0    Protein,  (A) Negative mg/dL    Urobilinogen, Urine 0.2 <2.0 E.U./dL    Nitrite, Urine Negative Negative    Leukocyte Esterase, Urine TRACE (A) Negative   CBC Auto Differential   Result Value Ref Range    WBC 11.1 4.5 - 11.5 E9/L    RBC 4.85 3.80 - 5.80 E12/L    Hemoglobin 14.9 12.5 - 16.5 g/dL    Hematocrit 43.4 37.0 - 54.0 %    MCV 89.5 80.0 - 99.9 fL    MCH 30.7 26.0 - 35.0 pg    MCHC 34.3 32.0 - 34.5 %    RDW 13.3 11.5 - 15.0 fL    Platelets 758 310 - 483 E9/L    MPV 11.3 7.0 - 12.0 fL    Neutrophils % 51.4 43.0 - 80.0 %    Immature Granulocytes % 0.2 0.0 - 5.0 %    Lymphocytes % 39.5 20.0 - 42.0 %    Monocytes % 7.4 2.0 - 12.0 %    Eosinophils % 1.2 0.0 - 6.0 % Basophils % 0.3 0.0 - 2.0 %    Neutrophils Absolute 5.69 1.80 - 7.30 E9/L    Immature Granulocytes # 0.02 E9/L    Lymphocytes Absolute 4.36 (H) 1.50 - 4.00 E9/L    Monocytes Absolute 0.82 0.10 - 0.95 E9/L    Eosinophils Absolute 0.13 0.05 - 0.50 E9/L    Basophils Absolute 0.03 0.00 - 0.20 E9/L   Comprehensive Metabolic Panel   Result Value Ref Range    Sodium 134 132 - 146 mmol/L    Potassium 3.3 (L) 3.5 - 5.0 mmol/L    Chloride 99 98 - 107 mmol/L    CO2 25 22 - 29 mmol/L    Anion Gap 10 7 - 16 mmol/L    Glucose 103 (H) 74 - 99 mg/dL    BUN 12 6 - 20 mg/dL    CREATININE 1.2 0.7 - 1.2 mg/dL    GFR Non-African American >60 >=60 mL/min/1.73    GFR African American >60     Calcium 9.1 8.6 - 10.2 mg/dL    Total Protein 7.1 6.4 - 8.3 g/dL    Albumin 4.1 3.5 - 5.2 g/dL    Total Bilirubin 0.7 0.0 - 1.2 mg/dL    Alkaline Phosphatase 85 40 - 129 U/L    ALT 12 0 - 40 U/L    AST 18 0 - 39 U/L   Microscopic Urinalysis   Result Value Ref Range    WBC, UA 0-1 0 - 5 /HPF    RBC, UA >20 0 - 2 /HPF    Bacteria, UA FEW (A) None Seen /HPF       Imaging: All Radiology results interpreted by Radiologist unless otherwise noted. CT ABDOMEN PELVIS WO CONTRAST Additional Contrast? None   Final Result   1. There is a 5.7 by 14 mm oval stone within the right renal pelvis with   there are no signs of acute obstruction   2. Nonobstructing 2 mm left renal calculus           ED Course / Medical Decision Making   Medications - No data to display     Re-examination:  12/10/21       Time: 0200  Patients condition remains stable. Consult(s):   None    Procedure(s):   None. MDM:   Patient presented with mild right flank pain and hematuria with history of kidney stones. CT of the abdomen pelvis did show a 5.7 x 14 mm oval stone within the right renal pelvis with no signs of acute obstruction. There is also a nonobstructing 2 mm left renal calculi. Patient appears well, nontoxic, and comfortable.   I am unsure if he already passed another

## 2022-02-04 ENCOUNTER — HOSPITAL ENCOUNTER (EMERGENCY)
Age: 47
Discharge: HOME | End: 2022-02-05
Attending: EMERGENCY MEDICINE
Payer: COMMERCIAL

## 2022-02-04 ENCOUNTER — APPOINTMENT (OUTPATIENT)
Dept: GENERAL RADIOLOGY | Age: 47
End: 2022-02-04
Payer: COMMERCIAL

## 2022-02-04 DIAGNOSIS — T50.905A ADVERSE EFFECT OF DRUG, INITIAL ENCOUNTER: Primary | ICD-10-CM

## 2022-02-04 LAB
ALBUMIN SERPL-MCNC: 4.4 G/DL (ref 3.5–5.2)
ALP BLD-CCNC: 90 U/L (ref 40–129)
ALT SERPL-CCNC: 11 U/L (ref 0–40)
ANION GAP SERPL CALCULATED.3IONS-SCNC: 15 MMOL/L (ref 7–16)
AST SERPL-CCNC: 22 U/L (ref 0–39)
ATYPICAL LYMPHOCYTE RELATIVE PERCENT: 1 % (ref 0–4)
BASOPHILS ABSOLUTE: 0 E9/L (ref 0–0.2)
BASOPHILS RELATIVE PERCENT: 0 % (ref 0–2)
BILIRUB SERPL-MCNC: 0.6 MG/DL (ref 0–1.2)
BILIRUBIN DIRECT: <0.2 MG/DL (ref 0–0.3)
BILIRUBIN, INDIRECT: NORMAL MG/DL (ref 0–1)
BUN BLDV-MCNC: 10 MG/DL (ref 6–20)
CALCIUM SERPL-MCNC: 9.8 MG/DL (ref 8.6–10.2)
CHLORIDE BLD-SCNC: 100 MMOL/L (ref 98–107)
CO2: 22 MMOL/L (ref 22–29)
CREAT SERPL-MCNC: 1.2 MG/DL (ref 0.7–1.2)
EOSINOPHILS ABSOLUTE: 0.13 E9/L (ref 0.05–0.5)
EOSINOPHILS RELATIVE PERCENT: 1 % (ref 0–6)
GFR AFRICAN AMERICAN: >60
GFR NON-AFRICAN AMERICAN: >60 ML/MIN/1.73
GLUCOSE BLD-MCNC: 156 MG/DL (ref 74–99)
HCT VFR BLD CALC: 46.7 % (ref 37–54)
HEMOGLOBIN: 16.1 G/DL (ref 12.5–16.5)
LYMPHOCYTES ABSOLUTE: 6.32 E9/L (ref 1.5–4)
LYMPHOCYTES RELATIVE PERCENT: 48 % (ref 20–42)
MCH RBC QN AUTO: 30.7 PG (ref 26–35)
MCHC RBC AUTO-ENTMCNC: 34.5 % (ref 32–34.5)
MCV RBC AUTO: 89 FL (ref 80–99.9)
MONOCYTES ABSOLUTE: 0.77 E9/L (ref 0.1–0.95)
MONOCYTES RELATIVE PERCENT: 6 % (ref 2–12)
NEUTROPHILS ABSOLUTE: 5.68 E9/L (ref 1.8–7.3)
NEUTROPHILS RELATIVE PERCENT: 44 % (ref 43–80)
PDW BLD-RTO: 12.8 FL (ref 11.5–15)
PLATELET # BLD: 247 E9/L (ref 130–450)
PMV BLD AUTO: 11.7 FL (ref 7–12)
POTASSIUM SERPL-SCNC: 3.2 MMOL/L (ref 3.5–5)
POTASSIUM SERPL-SCNC: 3.8 MMOL/L (ref 3.5–5)
RBC # BLD: 5.25 E12/L (ref 3.8–5.8)
RBC # BLD: NORMAL 10*6/UL
REASON FOR REJECTION: NORMAL
REJECTED TEST: NORMAL
SODIUM BLD-SCNC: 137 MMOL/L (ref 132–146)
TOTAL PROTEIN: 7.6 G/DL (ref 6.4–8.3)
TROPONIN, HIGH SENSITIVITY: 7 NG/L (ref 0–11)
WBC # BLD: 12.9 E9/L (ref 4.5–11.5)

## 2022-02-04 PROCEDURE — 84132 ASSAY OF SERUM POTASSIUM: CPT

## 2022-02-04 PROCEDURE — 80076 HEPATIC FUNCTION PANEL: CPT

## 2022-02-04 PROCEDURE — 84484 ASSAY OF TROPONIN QUANT: CPT

## 2022-02-04 PROCEDURE — 71045 X-RAY EXAM CHEST 1 VIEW: CPT

## 2022-02-04 PROCEDURE — 93005 ELECTROCARDIOGRAM TRACING: CPT | Performed by: EMERGENCY MEDICINE

## 2022-02-04 PROCEDURE — 6370000000 HC RX 637 (ALT 250 FOR IP): Performed by: EMERGENCY MEDICINE

## 2022-02-04 PROCEDURE — 85025 COMPLETE CBC W/AUTO DIFF WBC: CPT

## 2022-02-04 PROCEDURE — 80048 BASIC METABOLIC PNL TOTAL CA: CPT

## 2022-02-04 PROCEDURE — 2580000003 HC RX 258: Performed by: EMERGENCY MEDICINE

## 2022-02-04 PROCEDURE — 96360 HYDRATION IV INFUSION INIT: CPT

## 2022-02-04 PROCEDURE — 99285 EMERGENCY DEPT VISIT HI MDM: CPT

## 2022-02-04 PROCEDURE — 96361 HYDRATE IV INFUSION ADD-ON: CPT

## 2022-02-04 PROCEDURE — 80179 DRUG ASSAY SALICYLATE: CPT

## 2022-02-04 PROCEDURE — 80307 DRUG TEST PRSMV CHEM ANLYZR: CPT

## 2022-02-04 PROCEDURE — 80143 DRUG ASSAY ACETAMINOPHEN: CPT

## 2022-02-04 PROCEDURE — 82077 ASSAY SPEC XCP UR&BREATH IA: CPT

## 2022-02-04 RX ORDER — SODIUM CHLORIDE 0.9 % (FLUSH) 0.9 %
10 SYRINGE (ML) INJECTION PRN
Status: DISCONTINUED | OUTPATIENT
Start: 2022-02-04 | End: 2022-02-05 | Stop reason: HOSPADM

## 2022-02-04 RX ORDER — 0.9 % SODIUM CHLORIDE 0.9 %
1000 INTRAVENOUS SOLUTION INTRAVENOUS ONCE
Status: COMPLETED | OUTPATIENT
Start: 2022-02-04 | End: 2022-02-04

## 2022-02-04 RX ORDER — LORAZEPAM 1 MG/1
1 TABLET ORAL ONCE
Status: COMPLETED | OUTPATIENT
Start: 2022-02-04 | End: 2022-02-04

## 2022-02-04 RX ADMIN — LORAZEPAM 1 MG: 1 TABLET ORAL at 21:15

## 2022-02-04 RX ADMIN — SODIUM CHLORIDE 1000 ML: 9 INJECTION, SOLUTION INTRAVENOUS at 21:15

## 2022-02-04 ASSESSMENT — ENCOUNTER SYMPTOMS
ABDOMINAL PAIN: 0
EYE PAIN: 0
COUGH: 0
EYE DISCHARGE: 0
SINUS PRESSURE: 0
SORE THROAT: 0
HEMOPTYSIS: 0
NAUSEA: 0
ORTHOPNEA: 0
DIARRHEA: 0
SHORTNESS OF BREATH: 0
BACK PAIN: 0
VOMITING: 0
EYE REDNESS: 0
WHEEZING: 0

## 2022-02-05 VITALS
OXYGEN SATURATION: 99 % | DIASTOLIC BLOOD PRESSURE: 78 MMHG | HEART RATE: 98 BPM | SYSTOLIC BLOOD PRESSURE: 132 MMHG | WEIGHT: 180 LBS | TEMPERATURE: 97.7 F | HEIGHT: 69 IN | RESPIRATION RATE: 16 BRPM | BODY MASS INDEX: 26.66 KG/M2

## 2022-02-05 LAB
ACETAMINOPHEN LEVEL: <5 MCG/ML (ref 10–30)
AMPHETAMINE SCREEN, URINE: NOT DETECTED
BARBITURATE SCREEN URINE: NOT DETECTED
BENZODIAZEPINE SCREEN, URINE: NOT DETECTED
CANNABINOID SCREEN URINE: POSITIVE
COCAINE METABOLITE SCREEN URINE: NOT DETECTED
EKG ATRIAL RATE: 100 BPM
EKG ATRIAL RATE: 78 BPM
EKG P AXIS: 64 DEGREES
EKG P AXIS: 70 DEGREES
EKG P-R INTERVAL: 174 MS
EKG P-R INTERVAL: 196 MS
EKG Q-T INTERVAL: 372 MS
EKG Q-T INTERVAL: 400 MS
EKG QRS DURATION: 108 MS
EKG QRS DURATION: 110 MS
EKG QTC CALCULATION (BAZETT): 456 MS
EKG QTC CALCULATION (BAZETT): 479 MS
EKG R AXIS: 22 DEGREES
EKG R AXIS: 52 DEGREES
EKG T AXIS: 14 DEGREES
EKG T AXIS: 20 DEGREES
EKG VENTRICULAR RATE: 100 BPM
EKG VENTRICULAR RATE: 78 BPM
ETHANOL: <10 MG/DL (ref 0–0.08)
FENTANYL SCREEN, URINE: NOT DETECTED
Lab: ABNORMAL
METHADONE SCREEN, URINE: NOT DETECTED
OPIATE SCREEN URINE: NOT DETECTED
OXYCODONE URINE: NOT DETECTED
PHENCYCLIDINE SCREEN URINE: NOT DETECTED
SALICYLATE, SERUM: <0.3 MG/DL (ref 0–30)
TRICYCLIC ANTIDEPRESSANTS SCREEN SERUM: NEGATIVE NG/ML
TROPONIN, HIGH SENSITIVITY: 9 NG/L (ref 0–11)

## 2022-02-05 PROCEDURE — 80307 DRUG TEST PRSMV CHEM ANLYZR: CPT

## 2022-02-05 PROCEDURE — 93010 ELECTROCARDIOGRAM REPORT: CPT | Performed by: INTERNAL MEDICINE

## 2022-02-05 PROCEDURE — 84484 ASSAY OF TROPONIN QUANT: CPT

## 2022-02-05 PROCEDURE — 2580000003 HC RX 258: Performed by: EMERGENCY MEDICINE

## 2022-02-05 RX ORDER — 0.9 % SODIUM CHLORIDE 0.9 %
1000 INTRAVENOUS SOLUTION INTRAVENOUS ONCE
Status: COMPLETED | OUTPATIENT
Start: 2022-02-05 | End: 2022-02-05

## 2022-02-05 RX ADMIN — SODIUM CHLORIDE 1000 ML: 9 INJECTION, SOLUTION INTRAVENOUS at 00:30

## 2022-02-05 NOTE — ED PROVIDER NOTES
69-year-old male presents to the emergency department with diaphoresis anxiety after smoking cannabis. Patient states he has never had a symptoms like this when smoking. He states no nausea vomiting diarrhea abdominal pain or other complaints at this time. He states no cough or fevers    The history is provided by the patient. Palpitations  Palpitations quality:  Fast  Onset quality:  Gradual  Duration:  1 hour  Timing:  Intermittent  Progression:  Waxing and waning  Chronicity:  New  Context comment:  Smoked cannabis  Relieved by:  Nothing  Worsened by:  Nothing  Ineffective treatments:  None tried  Associated symptoms: diaphoresis    Associated symptoms: no back pain, no chest pain, no cough, no dizziness, no hemoptysis, no leg pain, no lower extremity edema, no nausea, no near-syncope, no orthopnea, no shortness of breath, no syncope, no vomiting and no weakness         Review of Systems   Constitutional: Positive for diaphoresis. Negative for chills and fever. HENT: Negative for ear pain, sinus pressure and sore throat. Eyes: Negative for pain, discharge and redness. Respiratory: Negative for cough, hemoptysis, shortness of breath and wheezing. Cardiovascular: Positive for palpitations. Negative for chest pain, orthopnea, syncope and near-syncope. Gastrointestinal: Negative for abdominal pain, diarrhea, nausea and vomiting. Genitourinary: Negative for dysuria and frequency. Musculoskeletal: Negative for arthralgias and back pain. Skin: Negative for rash and wound. Neurological: Negative for dizziness, weakness and headaches. Hematological: Negative for adenopathy. All other systems reviewed and are negative. Physical Exam  Constitutional:       Appearance: Normal appearance. He is diaphoretic. HENT:      Head: Normocephalic and atraumatic. Nose: Nose normal.   Eyes:      Extraocular Movements: Extraocular movements intact.       Pupils: Pupils are equal, round, and reactive to light. Cardiovascular:      Rate and Rhythm: Regular rhythm. Tachycardia present. Pulses: Normal pulses. Heart sounds: Normal heart sounds. Pulmonary:      Effort: Pulmonary effort is normal.      Breath sounds: Normal breath sounds. Abdominal:      General: Abdomen is flat. Bowel sounds are normal. There is no distension. Palpations: Abdomen is soft. Tenderness: There is no abdominal tenderness. There is no guarding. Musculoskeletal:         General: Normal range of motion. Cervical back: Normal range of motion and neck supple. Skin:     General: Skin is warm. Capillary Refill: Capillary refill takes less than 2 seconds. Neurological:      General: No focal deficit present. Mental Status: He is alert and oriented to person, place, and time. Psychiatric:         Mood and Affect: Mood is anxious. Procedures   EKG: Sinus rhythm, inferior inverted t waves (21:23)    EKG: normal sinus rhythm, nonspecific ST and T waves changes, with resolvent of inferior inverted T waves (21:51)  . MDM  Number of Diagnoses or Management Options  Adverse effect of drug, initial encounter  Diagnosis management comments: Patient seen and examined. Patient appears to be having a panic attack and is diaphoretic. Initial work-up was reassuring though he had some EKG changes likely secondary to substance ingested during marijuana abuse. Initial troponin was reassuring he was given a dose of Ativan. Repeat EKG showed resolvent of EKG changes he was continued to be observed for significant time with a repeat troponin showing no clear evidence of any cardiac damage patient was comfortable with plan of discharge. He is instructed to discontinue use of illicit substances. He states understanding and is agreeable to this.   Ultimately patient was felt to be safe for discharge and was discharged with outpatient follow-up --------------------------------------------- PAST HISTORY ---------------------------------------------  Past Medical History:  has a past medical history of Closed fracture of left distal radius, Essential hypertension, and Fracture of radial neck, right, closed. Past Surgical History:  has a past surgical history that includes Achilles tendon surgery (Left, 2/20/2019). Social History:  reports that he has never smoked. He has never used smokeless tobacco. He reports that he does not drink alcohol and does not use drugs. Family History: family history includes Diabetes in his father; High Blood Pressure in his mother. The patients home medications have been reviewed. Allergies: Patient has no known allergies.     -------------------------------------------------- RESULTS -------------------------------------------------  Labs:  Results for orders placed or performed during the hospital encounter of 02/04/22   CBC Auto Differential   Result Value Ref Range    WBC 12.9 (H) 4.5 - 11.5 E9/L    RBC 5.25 3.80 - 5.80 E12/L    Hemoglobin 16.1 12.5 - 16.5 g/dL    Hematocrit 46.7 37.0 - 54.0 %    MCV 89.0 80.0 - 99.9 fL    MCH 30.7 26.0 - 35.0 pg    MCHC 34.5 32.0 - 34.5 %    RDW 12.8 11.5 - 15.0 fL    Platelets 480 419 - 446 E9/L    MPV 11.7 7.0 - 12.0 fL    Neutrophils % 44.0 43.0 - 80.0 %    Lymphocytes % 48.0 (H) 20.0 - 42.0 %    Monocytes % 6.0 2.0 - 12.0 %    Eosinophils % 1.0 0.0 - 6.0 %    Basophils % 0.0 0.0 - 2.0 %    Neutrophils Absolute 5.68 1.80 - 7.30 E9/L    Lymphocytes Absolute 6.32 (H) 1.50 - 4.00 E9/L    Monocytes Absolute 0.77 0.10 - 0.95 E9/L    Eosinophils Absolute 0.13 0.05 - 0.50 E9/L    Basophils Absolute 0.00 0.00 - 0.20 E9/L    Atypical Lymphocytes Relative 1.0 0.0 - 4.0 %    RBC Morphology Normal    Basic Metabolic Panel   Result Value Ref Range    Sodium 137 132 - 146 mmol/L    Potassium 3.2 (L) 3.5 - 5.0 mmol/L    Chloride 100 98 - 107 mmol/L    CO2 22 22 - 29 mmol/L    Anion Gap 15 7 - 16 mmol/L    Glucose 156 (H) 74 - 99 mg/dL    BUN 10 6 - 20 mg/dL    CREATININE 1.2 0.7 - 1.2 mg/dL    GFR Non-African American >60 >=60 mL/min/1.73    GFR African American >60     Calcium 9.8 8.6 - 10.2 mg/dL   Hepatic Function Panel   Result Value Ref Range    Total Protein 7.6 6.4 - 8.3 g/dL    Albumin 4.4 3.5 - 5.2 g/dL    Alkaline Phosphatase 90 40 - 129 U/L    ALT 11 0 - 40 U/L    AST 22 0 - 39 U/L    Total Bilirubin 0.6 0.0 - 1.2 mg/dL    Bilirubin, Direct <0.2 0.0 - 0.3 mg/dL    Bilirubin, Indirect see below 0.0 - 1.0 mg/dL   Urine Drug Screen   Result Value Ref Range    Amphetamine Screen, Urine NOT DETECTED Negative <1000 ng/mL    Barbiturate Screen, Ur NOT DETECTED Negative < 200 ng/mL    Benzodiazepine Screen, Urine NOT DETECTED Negative < 200 ng/mL    Cannabinoid Scrn, Ur POSITIVE (A) Negative < 50ng/mL    Cocaine Metabolite Screen, Urine NOT DETECTED Negative < 300 ng/mL    Opiate Scrn, Ur NOT DETECTED Negative < 300ng/mL    PCP Screen, Urine NOT DETECTED Negative < 25 ng/mL    Methadone Screen, Urine NOT DETECTED Negative <300 ng/mL    Oxycodone Urine NOT DETECTED Negative <100 ng/mL    FENTANYL SCREEN, URINE NOT DETECTED Negative <1 ng/mL    Drug Screen Comment: see below    Serum Drug Screen   Result Value Ref Range    Ethanol Lvl <10 mg/dL    Acetaminophen Level <5.0 (L) 10.0 - 34.2 mcg/mL    Salicylate, Serum <6.0 0.0 - 30.0 mg/dL   SPECIMEN REJECTION   Result Value Ref Range    Rejected Test trp     Reason for Rejection see below    Troponin   Result Value Ref Range    Troponin, High Sensitivity 7 0 - 11 ng/L   Potassium   Result Value Ref Range    Potassium 3.8 3.5 - 5.0 mmol/L   Troponin   Result Value Ref Range    Troponin, High Sensitivity 9 0 - 11 ng/L   EKG 12 Lead   Result Value Ref Range    Ventricular Rate 100 BPM    Atrial Rate 100 BPM    P-R Interval 196 ms    QRS Duration 108 ms    Q-T Interval 372 ms    QTc Calculation (Bazett) 479 ms    P Axis 70 degrees    R Axis 52

## 2022-06-14 ENCOUNTER — HOSPITAL ENCOUNTER (OUTPATIENT)
Age: 47
Discharge: HOME | End: 2022-06-16
Payer: COMMERCIAL

## 2022-06-14 ENCOUNTER — HOSPITAL ENCOUNTER (OUTPATIENT)
Dept: GENERAL RADIOLOGY | Age: 47
Discharge: HOME | End: 2022-06-16
Payer: COMMERCIAL

## 2022-06-14 DIAGNOSIS — N20.1 CALCULUS OF URETER: ICD-10-CM

## 2022-06-14 PROCEDURE — 74018 RADEX ABDOMEN 1 VIEW: CPT

## 2022-06-27 LAB
BASOPHILS ABSOLUTE: 0.04 E9/L (ref 0–0.2)
BASOPHILS RELATIVE PERCENT: 0.2 % (ref 0–2)
EOSINOPHILS ABSOLUTE: 0.05 E9/L (ref 0.05–0.5)
EOSINOPHILS RELATIVE PERCENT: 0.2 % (ref 0–6)
HCT VFR BLD CALC: 49.8 % (ref 37–54)
HEMOGLOBIN: 16.7 G/DL (ref 12.5–16.5)
IMMATURE GRANULOCYTES #: 0.08 E9/L
IMMATURE GRANULOCYTES %: 0.4 % (ref 0–5)
LYMPHOCYTES ABSOLUTE: 1.6 E9/L (ref 1.5–4)
LYMPHOCYTES RELATIVE PERCENT: 7.8 % (ref 20–42)
MCH RBC QN AUTO: 30.6 PG (ref 26–35)
MCHC RBC AUTO-ENTMCNC: 33.5 % (ref 32–34.5)
MCV RBC AUTO: 91.2 FL (ref 80–99.9)
MONOCYTES ABSOLUTE: 0.93 E9/L (ref 0.1–0.95)
MONOCYTES RELATIVE PERCENT: 4.5 % (ref 2–12)
NEUTROPHILS ABSOLUTE: 17.92 E9/L (ref 1.8–7.3)
NEUTROPHILS RELATIVE PERCENT: 86.9 % (ref 43–80)
PDW BLD-RTO: 13 FL (ref 11.5–15)
PLATELET # BLD: 246 E9/L (ref 130–450)
PMV BLD AUTO: 11.1 FL (ref 7–12)
RBC # BLD: 5.46 E12/L (ref 3.8–5.8)
WBC # BLD: 20.6 E9/L (ref 4.5–11.5)

## 2022-06-27 PROCEDURE — 80053 COMPREHEN METABOLIC PANEL: CPT

## 2022-06-27 PROCEDURE — 85025 COMPLETE CBC W/AUTO DIFF WBC: CPT

## 2022-06-27 PROCEDURE — 96374 THER/PROPH/DIAG INJ IV PUSH: CPT

## 2022-06-27 PROCEDURE — 99284 EMERGENCY DEPT VISIT MOD MDM: CPT

## 2022-06-27 PROCEDURE — 83605 ASSAY OF LACTIC ACID: CPT

## 2022-06-27 PROCEDURE — 36415 COLL VENOUS BLD VENIPUNCTURE: CPT

## 2022-06-27 ASSESSMENT — PAIN SCALES - GENERAL: PAINLEVEL_OUTOF10: 10

## 2022-06-27 ASSESSMENT — PAIN DESCRIPTION - LOCATION: LOCATION: ABDOMEN;GROIN

## 2022-06-27 ASSESSMENT — PAIN DESCRIPTION - FREQUENCY: FREQUENCY: CONTINUOUS

## 2022-06-27 ASSESSMENT — PAIN - FUNCTIONAL ASSESSMENT: PAIN_FUNCTIONAL_ASSESSMENT: 0-10

## 2022-06-27 ASSESSMENT — PAIN DESCRIPTION - PAIN TYPE: TYPE: ACUTE PAIN

## 2022-06-27 ASSESSMENT — PAIN DESCRIPTION - DESCRIPTORS: DESCRIPTORS: ACHING

## 2022-06-28 ENCOUNTER — APPOINTMENT (OUTPATIENT)
Dept: CT IMAGING | Age: 47
End: 2022-06-28
Payer: COMMERCIAL

## 2022-06-28 ENCOUNTER — HOSPITAL ENCOUNTER (EMERGENCY)
Age: 47
Discharge: HOME | End: 2022-06-28
Attending: EMERGENCY MEDICINE
Payer: COMMERCIAL

## 2022-06-28 VITALS
OXYGEN SATURATION: 97 % | HEIGHT: 69 IN | DIASTOLIC BLOOD PRESSURE: 74 MMHG | WEIGHT: 182 LBS | HEART RATE: 70 BPM | RESPIRATION RATE: 16 BRPM | BODY MASS INDEX: 26.96 KG/M2 | SYSTOLIC BLOOD PRESSURE: 138 MMHG | TEMPERATURE: 97.2 F

## 2022-06-28 DIAGNOSIS — N20.0 KIDNEY STONE: Primary | ICD-10-CM

## 2022-06-28 DIAGNOSIS — R10.9 RIGHT FLANK PAIN: ICD-10-CM

## 2022-06-28 LAB
ALBUMIN SERPL-MCNC: 4.8 G/DL (ref 3.5–5.2)
ALP BLD-CCNC: 99 U/L (ref 40–129)
ALT SERPL-CCNC: 15 U/L (ref 0–40)
ANION GAP SERPL CALCULATED.3IONS-SCNC: 13 MMOL/L (ref 7–16)
AST SERPL-CCNC: 24 U/L (ref 0–39)
BACTERIA: ABNORMAL /HPF
BILIRUB SERPL-MCNC: 1 MG/DL (ref 0–1.2)
BILIRUBIN URINE: NEGATIVE
BLOOD, URINE: ABNORMAL
BUN BLDV-MCNC: 15 MG/DL (ref 6–20)
CALCIUM SERPL-MCNC: 10.2 MG/DL (ref 8.6–10.2)
CHLORIDE BLD-SCNC: 100 MMOL/L (ref 98–107)
CLARITY: CLEAR
CO2: 25 MMOL/L (ref 22–29)
COLOR: YELLOW
CREAT SERPL-MCNC: 1.2 MG/DL (ref 0.7–1.2)
EPITHELIAL CELLS, UA: ABNORMAL /HPF
GFR AFRICAN AMERICAN: >60
GFR NON-AFRICAN AMERICAN: >60 ML/MIN/1.73
GLUCOSE BLD-MCNC: 108 MG/DL (ref 74–99)
GLUCOSE URINE: NEGATIVE MG/DL
KETONES, URINE: 15 MG/DL
LACTIC ACID: 1 MMOL/L (ref 0.5–2.2)
LEUKOCYTE ESTERASE, URINE: NEGATIVE
NITRITE, URINE: NEGATIVE
PH UA: 6 (ref 5–9)
POTASSIUM REFLEX MAGNESIUM: 4.1 MMOL/L (ref 3.5–5)
PROTEIN UA: 100 MG/DL
RBC UA: ABNORMAL /HPF (ref 0–2)
SODIUM BLD-SCNC: 138 MMOL/L (ref 132–146)
SPECIFIC GRAVITY UA: >=1.03 (ref 1–1.03)
TOTAL PROTEIN: 8.6 G/DL (ref 6.4–8.3)
UROBILINOGEN, URINE: 1 E.U./DL
WBC UA: ABNORMAL /HPF (ref 0–5)

## 2022-06-28 PROCEDURE — 6360000002 HC RX W HCPCS: Performed by: EMERGENCY MEDICINE

## 2022-06-28 PROCEDURE — 81001 URINALYSIS AUTO W/SCOPE: CPT

## 2022-06-28 PROCEDURE — 74176 CT ABD & PELVIS W/O CONTRAST: CPT

## 2022-06-28 PROCEDURE — 2580000003 HC RX 258: Performed by: EMERGENCY MEDICINE

## 2022-06-28 RX ORDER — NAPROXEN 500 MG/1
500 TABLET ORAL 2 TIMES DAILY
Qty: 4 TABLET | Refills: 0 | Status: SHIPPED | OUTPATIENT
Start: 2022-06-28 | End: 2022-06-30

## 2022-06-28 RX ORDER — 0.9 % SODIUM CHLORIDE 0.9 %
1000 INTRAVENOUS SOLUTION INTRAVENOUS ONCE
Status: COMPLETED | OUTPATIENT
Start: 2022-06-28 | End: 2022-06-28

## 2022-06-28 RX ORDER — KETOROLAC TROMETHAMINE 30 MG/ML
15 INJECTION, SOLUTION INTRAMUSCULAR; INTRAVENOUS ONCE
Status: COMPLETED | OUTPATIENT
Start: 2022-06-28 | End: 2022-06-28

## 2022-06-28 RX ADMIN — SODIUM CHLORIDE 1000 ML: 9 INJECTION, SOLUTION INTRAVENOUS at 03:15

## 2022-06-28 RX ADMIN — KETOROLAC TROMETHAMINE 15 MG: 30 INJECTION, SOLUTION INTRAMUSCULAR; INTRAVENOUS at 03:17

## 2022-06-28 ASSESSMENT — PAIN SCALES - GENERAL: PAINLEVEL_OUTOF10: 5

## 2022-06-28 ASSESSMENT — PAIN - FUNCTIONAL ASSESSMENT: PAIN_FUNCTIONAL_ASSESSMENT: NONE - DENIES PAIN

## 2022-06-28 NOTE — ED PROVIDER NOTES
HPI:  6/28/22,   Time: 3:04 AM EDT       Kirsty Olvera is a 49 y.o. male presenting to the ED for right flank pain, beginning 16 hours ago. The complaint has been persistent, moderate in severity, and worsened by nothing. Patient is pleasant 41-year-old gentleman states has a history of kidney stones. He states he did have some right flank pain last week was seen by urology was started on pain medication and was told on an x-ray that he had a stone in the right kidney. Symptoms been waxing and waning since then but intensified and has been more persistent since this morning. Pain is right flank rating to right lower quadrant occasionally radicular symptoms into the groin area. No testicular pain. No swelling of the testicles no dysuria urgency or frequency no penile discharge no fevers or chills no testicular pain at this time. Most the pain at this time is still in the right flank area. No fevers or chills. Patient nausea earlier but no vomiting no nausea at this time. No chest pain or shortness of breath no numbness or weakness to extremities. Review of Systems:   Pertinent positives and negatives are stated within HPI, all other systems reviewed and are negative.          --------------------------------------------- PAST HISTORY ---------------------------------------------  Past Medical History:  has a past medical history of Closed fracture of left distal radius, Essential hypertension, and Fracture of radial neck, right, closed. Past Surgical History:  has a past surgical history that includes Achilles tendon surgery (Left, 2/20/2019). Social History:  reports that he has never smoked. He has never used smokeless tobacco. He reports that he does not drink alcohol and does not use drugs. Family History: family history includes Diabetes in his father; High Blood Pressure in his mother. The patients home medications have been reviewed.     Allergies: Patient has no known allergies. ---------------------------------------------------PHYSICAL EXAM--------------------------------------    Constitutional/General: Alert and oriented x3, well appearing, non toxic in NAD  Head: Normocephalic and atraumatic  Eyes: PERRL, EOMI, conjunctive normal, sclera non icteric  Mouth: Oropharynx clear, handling secretions, no trismus, no asymmetry of the posterior oropharynx or uvular edema  Neck: Supple, full ROM, non tender to palpation in the midline, no stridor, no crepitus, no meningeal signs  Respiratory: Lungs clear to auscultation bilaterally, no wheezes, rales, or rhonchi. Not in respiratory distress  Cardiovascular:  Regular rate. Regular rhythm. No murmurs, gallops, or rubs. 2+ distal pulses  Chest: No chest wall tenderness  GI:  Abdomen Soft, Non tender, Non distended. +BS. No organomegaly, no palpable masses,  No rebound, guarding, or rigidity. No Diggs's sign. No McBurney's point tenderness.  exam was normal with no testicular swelling nor tenderness. No masses or hernia noted. Musculoskeletal: Moves all extremities x 4. Warm and well perfused, no clubbing, cyanosis, or edema. Capillary refill <3 seconds  Integument: skin warm and dry. No rashes. Lymphatic: no lymphadenopathy noted  Neurologic: GCS 15, no focal deficits, symmetric strength 5/5 in the upper and lower extremities bilaterally  Psychiatric: Normal Affect    -------------------------------------------------- RESULTS -------------------------------------------------  I have personally reviewed all laboratory and imaging results for this patient. Results are listed below.      LABS:  Results for orders placed or performed during the hospital encounter of 06/28/22   CBC with Auto Differential   Result Value Ref Range    WBC 20.6 (H) 4.5 - 11.5 E9/L    RBC 5.46 3.80 - 5.80 E12/L    Hemoglobin 16.7 (H) 12.5 - 16.5 g/dL    Hematocrit 49.8 37.0 - 54.0 %    MCV 91.2 80.0 - 99.9 fL    MCH 30.6 26.0 - 35.0 pg    MCHC 33.5 32.0 - 34.5 %    RDW 13.0 11.5 - 15.0 fL    Platelets 371 096 - 461 E9/L    MPV 11.1 7.0 - 12.0 fL    Neutrophils % 86.9 (H) 43.0 - 80.0 %    Immature Granulocytes % 0.4 0.0 - 5.0 %    Lymphocytes % 7.8 (L) 20.0 - 42.0 %    Monocytes % 4.5 2.0 - 12.0 %    Eosinophils % 0.2 0.0 - 6.0 %    Basophils % 0.2 0.0 - 2.0 %    Neutrophils Absolute 17.92 (H) 1.80 - 7.30 E9/L    Immature Granulocytes # 0.08 E9/L    Lymphocytes Absolute 1.60 1.50 - 4.00 E9/L    Monocytes Absolute 0.93 0.10 - 0.95 E9/L    Eosinophils Absolute 0.05 0.05 - 0.50 E9/L    Basophils Absolute 0.04 0.00 - 0.20 E9/L   Urinalysis with Microscopic   Result Value Ref Range    Color, UA Yellow Straw/Yellow    Clarity, UA Clear Clear    Glucose, Ur Negative Negative mg/dL    Bilirubin Urine Negative Negative    Ketones, Urine 15 (A) Negative mg/dL    Specific Gravity, UA >=1.030 1.005 - 1.030    Blood, Urine LARGE (A) Negative    pH, UA 6.0 5.0 - 9.0    Protein,  (A) Negative mg/dL    Urobilinogen, Urine 1.0 <2.0 E.U./dL    Nitrite, Urine Negative Negative    Leukocyte Esterase, Urine Negative Negative    WBC, UA 5-10 (A) 0 - 5 /HPF    RBC, UA 10-20 (A) 0 - 2 /HPF    Epithelial Cells, UA RARE /HPF    Bacteria, UA NONE SEEN None Seen /HPF   Comprehensive Metabolic Panel w/ Reflex to MG   Result Value Ref Range    Sodium 138 132 - 146 mmol/L    Potassium reflex Magnesium 4.1 3.5 - 5.0 mmol/L    Chloride 100 98 - 107 mmol/L    CO2 25 22 - 29 mmol/L    Anion Gap 13 7 - 16 mmol/L    Glucose 108 (H) 74 - 99 mg/dL    BUN 15 6 - 20 mg/dL    CREATININE 1.2 0.7 - 1.2 mg/dL    GFR Non-African American >60 >=60 mL/min/1.73    GFR African American >60     Calcium 10.2 8.6 - 10.2 mg/dL    Total Protein 8.6 (H) 6.4 - 8.3 g/dL    Albumin 4.8 3.5 - 5.2 g/dL    Total Bilirubin 1.0 0.0 - 1.2 mg/dL    Alkaline Phosphatase 99 40 - 129 U/L    ALT 15 0 - 40 U/L    AST 24 0 - 39 U/L   Lactic Acid   Result Value Ref Range    Lactic Acid 1.0 0.5 - 2.2 mmol/L appendicitis          This patient has remained hemodynamically stable during their ED course. Patient is CBC with a white count was elevated at 20. No recent fevers or chills. He was given IV fluids was given a dose of Toradol this completely relieved his pain urinalysis showed signs of UTI. Negative nitrites negative leuks. Large blood however was seen. Hemoglobin was normal at 16.7. Chemistry was normal creatinine was normal.  Lactic acid was normal.  While patient was here in the emergency room and went to the bathroom he felt like he passed a stone. He had immediate relief of pain. CT abdomen pelvis was completed after that which showed a nonobstructing stone at 1.3 cm in the right renal pelvis. He also showed signs of constipation no other acute findings. No signs of hydronephrosis. No signs of pyelonephritis. Appendix is within normal limits. Repeat abdominal exam is continued to be soft and nontender no CVA tenderness. Feel likely elevated white count was due to pain and stress reaction is possible the patient recently passed a stone given that the patient felt immediate relief after urinating and felt that he may have passed a stone in the bathroom. Patient has no pain at this time complete release of relief of all symptoms. He will be discharged home with naproxen given close outpatient follow-up with urology as well as his PCP. Patient stable for discharge repeat abdominal exam is soft nontender before discharge. Re-Evaluations:             Re-evaluation. Patients symptoms are improving    Re-examination  6/28/22   3:04 AM EDT          Vital Signs:   Vitals:    06/27/22 2241 06/28/22 0638   BP: (!) 141/93 138/74   Pulse: 69 70   Resp: 18 16   Temp: 97.2 °F (36.2 °C)    TempSrc: Infrared    SpO2: 99% 97%   Weight: 182 lb (82.6 kg)    Height: 5' 9\" (1.753 m)            Counseling:    The emergency provider has spoken with the patient and discussed todays results, in addition to providing specific details for the plan of care and counseling regarding the diagnosis and prognosis. Questions are answered at this time and they are agreeable with the plan.       --------------------------------- IMPRESSION AND DISPOSITION ---------------------------------    IMPRESSION  1. Kidney stone    2. Right flank pain Improving       DISPOSITION  Disposition: Discharge to home  Patient condition is stable    NOTE: This report was transcribed using voice recognition software.  Every effort was made to ensure accuracy; however, inadvertent computerized transcription errors may be present        Hemant Andrade MD  06/29/22 0631

## 2022-07-27 ENCOUNTER — HOSPITAL ENCOUNTER (OUTPATIENT)
Dept: GENERAL RADIOLOGY | Age: 47
Discharge: HOME | End: 2022-07-29
Payer: COMMERCIAL

## 2022-07-27 ENCOUNTER — HOSPITAL ENCOUNTER (OUTPATIENT)
Age: 47
Discharge: HOME | End: 2022-07-29
Payer: COMMERCIAL

## 2022-07-27 DIAGNOSIS — N20.1 CALCULUS OF URETER: ICD-10-CM

## 2022-07-27 PROCEDURE — 74018 RADEX ABDOMEN 1 VIEW: CPT

## 2022-11-10 ENCOUNTER — HOSPITAL ENCOUNTER (EMERGENCY)
Age: 47
Discharge: HOME | End: 2022-11-11
Attending: EMERGENCY MEDICINE
Payer: COMMERCIAL

## 2022-11-10 DIAGNOSIS — U07.1 COVID-19: Primary | ICD-10-CM

## 2022-11-10 PROCEDURE — 87635 SARS-COV-2 COVID-19 AMP PRB: CPT

## 2022-11-10 PROCEDURE — 96374 THER/PROPH/DIAG INJ IV PUSH: CPT

## 2022-11-10 PROCEDURE — 99285 EMERGENCY DEPT VISIT HI MDM: CPT

## 2022-11-10 PROCEDURE — 80048 BASIC METABOLIC PNL TOTAL CA: CPT

## 2022-11-10 PROCEDURE — 36415 COLL VENOUS BLD VENIPUNCTURE: CPT

## 2022-11-10 PROCEDURE — 87502 INFLUENZA DNA AMP PROBE: CPT

## 2022-11-10 PROCEDURE — 84484 ASSAY OF TROPONIN QUANT: CPT

## 2022-11-10 PROCEDURE — 85025 COMPLETE CBC W/AUTO DIFF WBC: CPT

## 2022-11-10 PROCEDURE — 96375 TX/PRO/DX INJ NEW DRUG ADDON: CPT

## 2022-11-10 ASSESSMENT — PAIN - FUNCTIONAL ASSESSMENT: PAIN_FUNCTIONAL_ASSESSMENT: 0-10

## 2022-11-10 ASSESSMENT — ENCOUNTER SYMPTOMS
VOMITING: 0
ABDOMINAL PAIN: 0
EYE REDNESS: 0
SHORTNESS OF BREATH: 0
NAUSEA: 0

## 2022-11-10 ASSESSMENT — PAIN SCALES - GENERAL: PAINLEVEL_OUTOF10: 2

## 2022-11-11 ENCOUNTER — APPOINTMENT (OUTPATIENT)
Dept: GENERAL RADIOLOGY | Age: 47
End: 2022-11-11
Payer: COMMERCIAL

## 2022-11-11 VITALS
OXYGEN SATURATION: 100 % | BODY MASS INDEX: 26.96 KG/M2 | TEMPERATURE: 98.1 F | RESPIRATION RATE: 16 BRPM | HEIGHT: 69 IN | WEIGHT: 182 LBS | SYSTOLIC BLOOD PRESSURE: 136 MMHG | DIASTOLIC BLOOD PRESSURE: 71 MMHG | HEART RATE: 61 BPM

## 2022-11-11 LAB
ANION GAP SERPL CALCULATED.3IONS-SCNC: 12 MMOL/L (ref 7–16)
BASOPHILS ABSOLUTE: 0.03 E9/L (ref 0–0.2)
BASOPHILS RELATIVE PERCENT: 0.4 % (ref 0–2)
BUN BLDV-MCNC: 13 MG/DL (ref 6–20)
CALCIUM SERPL-MCNC: 9.7 MG/DL (ref 8.6–10.2)
CHLORIDE BLD-SCNC: 99 MMOL/L (ref 98–107)
CO2: 26 MMOL/L (ref 22–29)
CREAT SERPL-MCNC: 1.2 MG/DL (ref 0.7–1.2)
EKG ATRIAL RATE: 50 BPM
EKG P AXIS: 59 DEGREES
EKG P-R INTERVAL: 186 MS
EKG Q-T INTERVAL: 434 MS
EKG QRS DURATION: 98 MS
EKG QTC CALCULATION (BAZETT): 395 MS
EKG R AXIS: 55 DEGREES
EKG T AXIS: 36 DEGREES
EKG VENTRICULAR RATE: 50 BPM
EOSINOPHILS ABSOLUTE: 0.02 E9/L (ref 0.05–0.5)
EOSINOPHILS RELATIVE PERCENT: 0.3 % (ref 0–6)
GFR SERPL CREATININE-BSD FRML MDRD: >60 ML/MIN/1.73
GLUCOSE BLD-MCNC: 93 MG/DL (ref 74–99)
HCT VFR BLD CALC: 49.7 % (ref 37–54)
HEMOGLOBIN: 16.7 G/DL (ref 12.5–16.5)
IMMATURE GRANULOCYTES #: 0.02 E9/L
IMMATURE GRANULOCYTES %: 0.3 % (ref 0–5)
INFLUENZA A BY PCR: NOT DETECTED
INFLUENZA B BY PCR: NOT DETECTED
LYMPHOCYTES ABSOLUTE: 2.99 E9/L (ref 1.5–4)
LYMPHOCYTES RELATIVE PERCENT: 37.6 % (ref 20–42)
MCH RBC QN AUTO: 30.7 PG (ref 26–35)
MCHC RBC AUTO-ENTMCNC: 33.6 % (ref 32–34.5)
MCV RBC AUTO: 91.4 FL (ref 80–99.9)
MONOCYTES ABSOLUTE: 0.61 E9/L (ref 0.1–0.95)
MONOCYTES RELATIVE PERCENT: 7.7 % (ref 2–12)
NEUTROPHILS ABSOLUTE: 4.28 E9/L (ref 1.8–7.3)
NEUTROPHILS RELATIVE PERCENT: 53.7 % (ref 43–80)
PDW BLD-RTO: 12.8 FL (ref 11.5–15)
PLATELET # BLD: 227 E9/L (ref 130–450)
PMV BLD AUTO: 10.6 FL (ref 7–12)
POTASSIUM REFLEX MAGNESIUM: 4 MMOL/L (ref 3.5–5)
RBC # BLD: 5.44 E12/L (ref 3.8–5.8)
SARS-COV-2, NAAT: DETECTED
SODIUM BLD-SCNC: 137 MMOL/L (ref 132–146)
TROPONIN, HIGH SENSITIVITY: 7 NG/L (ref 0–11)
WBC # BLD: 8 E9/L (ref 4.5–11.5)

## 2022-11-11 PROCEDURE — 93005 ELECTROCARDIOGRAM TRACING: CPT | Performed by: EMERGENCY MEDICINE

## 2022-11-11 PROCEDURE — 2580000003 HC RX 258: Performed by: EMERGENCY MEDICINE

## 2022-11-11 PROCEDURE — 93010 ELECTROCARDIOGRAM REPORT: CPT | Performed by: INTERNAL MEDICINE

## 2022-11-11 PROCEDURE — 96374 THER/PROPH/DIAG INJ IV PUSH: CPT

## 2022-11-11 PROCEDURE — 96375 TX/PRO/DX INJ NEW DRUG ADDON: CPT

## 2022-11-11 PROCEDURE — 71045 X-RAY EXAM CHEST 1 VIEW: CPT

## 2022-11-11 PROCEDURE — 6360000002 HC RX W HCPCS: Performed by: EMERGENCY MEDICINE

## 2022-11-11 RX ORDER — 0.9 % SODIUM CHLORIDE 0.9 %
1000 INTRAVENOUS SOLUTION INTRAVENOUS ONCE
Status: COMPLETED | OUTPATIENT
Start: 2022-11-11 | End: 2022-11-11

## 2022-11-11 RX ORDER — ONDANSETRON 4 MG/1
4 TABLET, ORALLY DISINTEGRATING ORAL EVERY 8 HOURS PRN
Qty: 10 TABLET | Refills: 0 | Status: SHIPPED | OUTPATIENT
Start: 2022-11-11

## 2022-11-11 RX ORDER — ONDANSETRON 2 MG/ML
4 INJECTION INTRAMUSCULAR; INTRAVENOUS ONCE
Status: COMPLETED | OUTPATIENT
Start: 2022-11-11 | End: 2022-11-11

## 2022-11-11 RX ORDER — KETOROLAC TROMETHAMINE 30 MG/ML
30 INJECTION, SOLUTION INTRAMUSCULAR; INTRAVENOUS ONCE
Status: COMPLETED | OUTPATIENT
Start: 2022-11-11 | End: 2022-11-11

## 2022-11-11 RX ORDER — NAPROXEN 250 MG/1
250 TABLET ORAL 2 TIMES DAILY
Qty: 14 TABLET | Refills: 0 | Status: SHIPPED | OUTPATIENT
Start: 2022-11-11 | End: 2022-11-18

## 2022-11-11 RX ADMIN — ONDANSETRON 4 MG: 2 INJECTION INTRAMUSCULAR; INTRAVENOUS at 01:47

## 2022-11-11 RX ADMIN — KETOROLAC TROMETHAMINE 30 MG: 30 INJECTION, SOLUTION INTRAMUSCULAR at 01:47

## 2022-11-11 RX ADMIN — SODIUM CHLORIDE 1000 ML: 9 INJECTION, SOLUTION INTRAVENOUS at 01:48

## 2022-11-11 NOTE — ED NOTES
Department of Emergency Medicine  FIRST PROVIDER TRIAGE NOTE             [facility]           11/10/22  8:49 PM EST    Date of Encounter: 11/10/22   MRN: [id number]      HPI: Shaun Shipley is a 49 y.o. male who presents to the ED for Headache and Fever (Chills and sweats for last few days. )  Per patient he has had a headache for the past 3 days, along with fevers and chills. Sore throat, cough or congestion. Patient states he attempted to use Tylenol at home with no relief. ROS: Negative for cp or sob or dizziness. PE: Gen Appearance/Constitutional: alert  CV: regular rate  Pulm: CTA bilat     Initial Plan of Care: All treatment areas with department are currently occupied. Plan to order/Initiate the following while awaiting opening in ED: labs.   Initiate Treatment-Testing, Proceed toTreatment Area When Bed Available for ED Attending/CECILLEP to Continue Care    Electronically signed by SHIRLEY Rodriguez CNP   DD: 11/10/22       SHIRLEY Rodriguez CNP  11/10/22 2051

## 2022-11-11 NOTE — ED PROVIDER NOTES
Chief complaint: Dizziness, chills      HPI:  11/10/22, Time: 11:53 PM EST    HPI         Refugio Jiang is a 49 y.o. male presenting to the ED for dizziness, chills and fatigue. The history is obtained from the patient as well as patient's medical record. It is documented that the patient has headache. The patient states he actually does not have a headache. He does have dizziness. He does complain of fatigue. He began approximately 3 days ago with dizziness, chills and fatigue. He states that he does feel mildly lightheaded this is mild in severity. Nothing makes better. Nothing makes it worse. No treatment prior to arrival.  He does have subjective chills. He denies any chest pain, shortness of breath, nausea, vomiting or abdominal pain. He denies any numbness, weakness or paresthesias of extremities. Denies any neck pain or stiffness. ROS:   Review of Systems   Constitutional:  Positive for chills and fatigue. Negative for fever. HENT:  Negative for congestion. Eyes:  Negative for redness. Respiratory:  Negative for shortness of breath. Cardiovascular:  Negative for chest pain. Gastrointestinal:  Negative for abdominal pain, nausea and vomiting. Genitourinary:  Negative for dysuria. Musculoskeletal:  Negative for arthralgias. Skin:  Negative for rash. Neurological:  Positive for dizziness and light-headedness. Psychiatric/Behavioral:  Negative for confusion. All other systems reviewed and are negative.    --------------------------------------------- PAST HISTORY ---------------------------------------------  Past Medical History:  has a past medical history of Closed fracture of left distal radius, Essential hypertension, and Fracture of radial neck, right, closed. Past Surgical History:  has a past surgical history that includes Achilles tendon surgery (Left, 2/20/2019). Social History:  reports that he has never smoked.  He has never used smokeless tobacco. He reports that he does not drink alcohol and does not use drugs. Family History: family history includes Diabetes in his father; High Blood Pressure in his mother. The patients home medications have been reviewed. Allergies: Patient has no known allergies. ---------------------------------------------------PHYSICAL EXAM--------------------------------------      Constitutional/General: Alert and oriented x3, well appearing, non toxic in NAD  Head: Normocephalic and atraumatic  Ears: Tympanic membranes unremarkable bilaterally  Mouth: Oropharynx clear, handling secretions, no trismus  Neck: Supple, full ROM, no nuchal rigidity  Pulmonary: Lungs clear to auscultation bilaterally, no wheezes, rales, or rhonchi. Not in respiratory distress  Cardiovascular:  Regular rate. Regular rhythm. No murmurs  Chest: no chest wall tenderness  Abdomen: Soft. Non tender. Non distended. No rebound, guarding, or rigidity. No pulsatile masses appreciated. Musculoskeletal: Moves all extremities x 4. Warm and well perfused, no clubbing, cyanosis, or edema. Capillary refill <3 seconds  Skin: warm and dry. No rashes. Neurologic: GCS 15, CN 2-12 grossly intact, no focal deficits, symmetric strength 5/5 in the upper and lower extremities bilaterally. Speech normal. Normal finger to nose. No drift. Psych: Normal Affect    -------------------------------------------------- RESULTS -------------------------------------------------  I have personally reviewed all laboratory and imaging results for this patient. Results are listed below.      LABS:  Results for orders placed or performed during the hospital encounter of 11/10/22   COVID-19, Rapid    Specimen: Nasopharyngeal Swab   Result Value Ref Range    SARS-CoV-2, NAAT DETECTED (A) Not Detected   Rapid influenza A/B antigens    Specimen: Nasopharyngeal   Result Value Ref Range    Influenza A by PCR Not Detected Not Detected    Influenza B by PCR Not Detected Not Detected   CBC with Auto Differential   Result Value Ref Range    WBC 8.0 4.5 - 11.5 E9/L    RBC 5.44 3.80 - 5.80 E12/L    Hemoglobin 16.7 (H) 12.5 - 16.5 g/dL    Hematocrit 49.7 37.0 - 54.0 %    MCV 91.4 80.0 - 99.9 fL    MCH 30.7 26.0 - 35.0 pg    MCHC 33.6 32.0 - 34.5 %    RDW 12.8 11.5 - 15.0 fL    Platelets 693 527 - 253 E9/L    MPV 10.6 7.0 - 12.0 fL    Neutrophils % 53.7 43.0 - 80.0 %    Immature Granulocytes % 0.3 0.0 - 5.0 %    Lymphocytes % 37.6 20.0 - 42.0 %    Monocytes % 7.7 2.0 - 12.0 %    Eosinophils % 0.3 0.0 - 6.0 %    Basophils % 0.4 0.0 - 2.0 %    Neutrophils Absolute 4.28 1.80 - 7.30 E9/L    Immature Granulocytes # 0.02 E9/L    Lymphocytes Absolute 2.99 1.50 - 4.00 E9/L    Monocytes Absolute 0.61 0.10 - 0.95 E9/L    Eosinophils Absolute 0.02 (L) 0.05 - 0.50 E9/L    Basophils Absolute 0.03 0.00 - 0.20 S0/K   Basic Metabolic Panel w/ Reflex to MG   Result Value Ref Range    Sodium 137 132 - 146 mmol/L    Potassium reflex Magnesium 4.0 3.5 - 5.0 mmol/L    Chloride 99 98 - 107 mmol/L    CO2 26 22 - 29 mmol/L    Anion Gap 12 7 - 16 mmol/L    Glucose 93 74 - 99 mg/dL    BUN 13 6 - 20 mg/dL    Creatinine 1.2 0.7 - 1.2 mg/dL    Est, Glom Filt Rate >60 >=60 mL/min/1.73    Calcium 9.7 8.6 - 10.2 mg/dL   Troponin   Result Value Ref Range    Troponin, High Sensitivity 7 0 - 11 ng/L   EKG 12 Lead   Result Value Ref Range    Ventricular Rate 50 BPM    Atrial Rate 50 BPM    P-R Interval 186 ms    QRS Duration 98 ms    Q-T Interval 434 ms    QTc Calculation (Bazett) 395 ms    P Axis 59 degrees    R Axis 55 degrees    T Axis 36 degrees       RADIOLOGY:  Interpreted by Radiologist.  XR CHEST PORTABLE   Final Result   No acute disease. RECOMMENDATION:   Careful clinical correlation and follow up recommended. EKG:  This EKG is signed and interpreted by me.     Sinus bradycardia rate of 50, no ST segment elevation or depression, MD interval 186 MS, QRS 98 MS, QTc 395 MS  Interpreted by me      ------------------------- NURSING NOTES AND VITALS REVIEWED ---------------------------   The nursing notes within the ED encounter and vital signs as below have been reviewed by myself. /71   Pulse 61   Temp 98.1 °F (36.7 °C) (Oral)   Resp 16   Ht 5' 9\" (1.753 m)   Wt 182 lb (82.6 kg)   SpO2 100%   BMI 26.88 kg/m²   Oxygen Saturation Interpretation: Normal    The patients available past medical records and past encounters were reviewed. ------------------------------ ED COURSE/MEDICAL DECISION MAKING----------------------  Medications   0.9 % sodium chloride bolus (1,000 mLs IntraVENous New Bag 11/11/22 0148)   ketorolac (TORADOL) injection 30 mg (30 mg IntraVENous Given 11/11/22 0147)   ondansetron (ZOFRAN) injection 4 mg (4 mg IntraVENous Given 11/11/22 0147)             Medical Decision Making:   I, Dr. Lashell Zabala am the primary physician of record. Jacinto Yarbrough is a 49 y.o. male who presents to the ED for chills, fatigue dizziness. The patient did have labs and imaging which were reviewed. Patient did have CBC fairly unremarkable, BMP unremarkable, high-sensitivity troponin 7, EKG with no ischemic changes. Patient received IV fluids symptomatic treatment. Patient no neurologic deficits. No meningeal signs. The patient was found to have COVID-19. Patient will be discharged and follow-up outpatient. Re-Evaluations/Consultations:             ED Course as of 11/11/22 0209   Kory Conroy Nov 11, 2022 0124 Patient in bed results discussed. [MT]      ED Course User Index  [MT] Shaina Aguirre DO               This patient's ED course included: History, physical examination, reevaluation prior to disposition, labs, imaging    This patient has remained hemodynamically stable during their ED course. Counseling:    The emergency provider has spoken with the patient and discussed todays results, in addition to providing specific details for the plan of care and counseling regarding the diagnosis and prognosis. Questions are answered at this time and they are agreeable with the plan.       --------------------------------- IMPRESSION AND DISPOSITION ---------------------------------    IMPRESSION  1. COVID-19        DISPOSITION  Disposition: Discharge to home  Patient condition is stable        NOTE: This report was transcribed using voice recognition software.  Every effort was made to ensure accuracy; however, inadvertent computerized transcription errors may be present         Jaylen Lin, DO  11/11/22 [id number], DO  11/11/22 0210

## 2024-03-14 ENCOUNTER — APPOINTMENT (OUTPATIENT)
Dept: CT IMAGING | Age: 49
End: 2024-03-14
Payer: OTHER MISCELLANEOUS

## 2024-03-14 ENCOUNTER — HOSPITAL ENCOUNTER (EMERGENCY)
Age: 49
Discharge: HOME | End: 2024-03-14
Payer: OTHER MISCELLANEOUS

## 2024-03-14 VITALS
WEIGHT: 194 LBS | RESPIRATION RATE: 16 BRPM | BODY MASS INDEX: 28.73 KG/M2 | HEIGHT: 69 IN | HEART RATE: 57 BPM | TEMPERATURE: 98.6 F | OXYGEN SATURATION: 100 % | SYSTOLIC BLOOD PRESSURE: 153 MMHG | DIASTOLIC BLOOD PRESSURE: 96 MMHG

## 2024-03-14 DIAGNOSIS — S16.1XXA STRAIN OF NECK MUSCLE, INITIAL ENCOUNTER: ICD-10-CM
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
## 2024-03-14 DIAGNOSIS — S29.012A STRAIN OF THORACIC BACK REGION: ICD-10-CM

## 2024-03-14 PROCEDURE — 72125 CT NECK SPINE W/O DYE: CPT

## 2024-03-14 PROCEDURE — 72128 CT CHEST SPINE W/O DYE: CPT

## 2024-03-14 PROCEDURE — 99284 EMERGENCY DEPT VISIT MOD MDM: CPT

## 2024-03-14 PROCEDURE — 6370000000 HC RX 637 (ALT 250 FOR IP)

## 2024-03-14 RX ORDER — NAPROXEN 500 MG/1
500 TABLET ORAL 2 TIMES DAILY PRN
Qty: 60 TABLET | Refills: 0 | Status: SHIPPED | OUTPATIENT
Start: 2024-03-14

## 2024-03-14 RX ORDER — ACETAMINOPHEN 500 MG
1000 TABLET ORAL ONCE
Status: COMPLETED | OUTPATIENT
Start: 2024-03-14 | End: 2024-03-14

## 2024-03-14 RX ORDER — CYCLOBENZAPRINE HCL 10 MG
10 TABLET ORAL 3 TIMES DAILY PRN
Qty: 21 TABLET | Refills: 0 | Status: SHIPPED | OUTPATIENT
Start: 2024-03-14 | End: 2024-03-24

## 2024-03-14 RX ADMIN — ACETAMINOPHEN 1000 MG: 500 TABLET ORAL at 23:02

## 2024-03-14 ASSESSMENT — PAIN - FUNCTIONAL ASSESSMENT: PAIN_FUNCTIONAL_ASSESSMENT: 0-10

## 2024-03-14 ASSESSMENT — PAIN SCALES - GENERAL: PAINLEVEL_OUTOF10: 8

## 2024-03-14 ASSESSMENT — PAIN DESCRIPTION - LOCATION: LOCATION: BACK

## 2024-03-15 NOTE — ED PROVIDER NOTES
inadvertent computerized transcription errors may be present.  END OF ED PROVIDER NOTE       Susan Milligan, APRN - CNP  03/15/24 [id number]

## (undated) DEVICE — GOWN,BREATHABLE SLV,AURORA,XLG,STRL: Brand: MEDLINE

## (undated) DEVICE — SET ORTHO STD STORTSTD1

## (undated) DEVICE — SPLINT ORTH W5INXL2.5FT PLSTR UNPD PRECUT FAST CRM

## (undated) DEVICE — CANNULA IV 18GA L15IN BLNT FILL LUERLOCK HUB MJCT

## (undated) DEVICE — 3M™ STERI-DRAPE™ U-DRAPE 1015: Brand: STERI-DRAPE™

## (undated) DEVICE — Z DISCONTINUED USE 2275686 GLOVE SURG SZ 8 L12IN FNGR THK13MIL WHT ISOLEX POLYISOPRENE

## (undated) DEVICE — PATIENT RETURN ELECTRODE, SINGLE-USE, CONTACT QUALITY MONITORING, ADULT, WITH 9FT CORD, FOR PATIENTS WEIGING OVER 33LBS. (15KG): Brand: MEGADYNE

## (undated) DEVICE — SET ORTHO STD STORTSTD2

## (undated) DEVICE — SOLUTION IV IRRIG POUR BRL 0.9% SODIUM CHL 2F7124

## (undated) DEVICE — GLOVE ORANGE PI 8   MSG9080

## (undated) DEVICE — PADDING,UNDERCAST,COTTON, 4"X4YD STERILE: Brand: MEDLINE

## (undated) DEVICE — GAUZE,SPONGE,4"X4",8PLY,STRL,LF,10/TRAY: Brand: MEDLINE

## (undated) DEVICE — Device

## (undated) DEVICE — CHLORAPREP 26ML ORANGE

## (undated) DEVICE — TUBING SUCT 12FR MAL ALUM SHFT FN CAP VENT UNIV CONN W/ OBT

## (undated) DEVICE — DRESSING,GAUZE,XEROFORM,CURAD,5"X9",ST: Brand: CURAD

## (undated) DEVICE — SUTURE FIBERWIRE SZ 5 L38IN NONABSORBABLE BLU L48MM 1/2 AR7211

## (undated) DEVICE — 4-PORT MANIFOLD: Brand: NEPTUNE 2

## (undated) DEVICE — SUTURE FIBERWIRE FIBERSTICK SZ 2-0 L50/12IN NONABSORBABLE AR7209

## (undated) DEVICE — TOTAL KNEE PK

## (undated) DEVICE — BANDAGE COMPR W4INXL10YD WHITE/BEIGE E MTRX HK LOOP CLSR

## (undated) DEVICE — ZIMMER® STERILE DISPOSABLE TOURNIQUET CUFF WITH PROTECTIVE SLEEVE AND PLC, DUAL PORT, SINGLE BLADDER, 34 IN. (86 CM)

## (undated) DEVICE — INTENDED FOR TISSUE SEPARATION, AND OTHER PROCEDURES THAT REQUIRE A SHARP SURGICAL BLADE TO PUNCTURE OR CUT.: Brand: BARD-PARKER ® STAINLESS STEEL BLADES

## (undated) DEVICE — SURGICAL PROCEDURE PACK BASIC

## (undated) DEVICE — 3M™ IOBAN™ 2 ANTIMICROBIAL INCISE DRAPE 6650EZ: Brand: IOBAN™ 2